# Patient Record
Sex: MALE | Race: WHITE | NOT HISPANIC OR LATINO | Employment: FULL TIME | URBAN - METROPOLITAN AREA
[De-identification: names, ages, dates, MRNs, and addresses within clinical notes are randomized per-mention and may not be internally consistent; named-entity substitution may affect disease eponyms.]

---

## 2023-11-14 ENCOUNTER — APPOINTMENT (OUTPATIENT)
Dept: PRIMARY CARE | Facility: CLINIC | Age: 58
End: 2023-11-14
Payer: COMMERCIAL

## 2023-11-15 ENCOUNTER — APPOINTMENT (OUTPATIENT)
Dept: NEUROLOGY | Facility: CLINIC | Age: 58
End: 2023-11-15
Payer: COMMERCIAL

## 2023-11-21 ENCOUNTER — OFFICE VISIT (OUTPATIENT)
Dept: NEUROLOGY | Facility: CLINIC | Age: 58
End: 2023-11-21
Payer: COMMERCIAL

## 2023-11-21 DIAGNOSIS — R56.9 SEIZURE (MULTI): Primary | ICD-10-CM

## 2023-11-21 PROCEDURE — 99213 OFFICE O/P EST LOW 20 MIN: CPT | Performed by: NURSE PRACTITIONER

## 2023-11-21 RX ORDER — LAMOTRIGINE 200 MG/1
200 TABLET ORAL 2 TIMES DAILY
COMMUNITY
Start: 2012-07-05 | End: 2023-11-21 | Stop reason: SDUPTHER

## 2023-11-21 RX ORDER — LAMOTRIGINE 200 MG/1
200 TABLET ORAL 2 TIMES DAILY
Qty: 180 TABLET | Refills: 3 | Status: SHIPPED | OUTPATIENT
Start: 2023-11-21 | End: 2024-11-20

## 2023-11-21 NOTE — PROGRESS NOTES
"    Patient ID: Sarthak Prado \"Pawan\" 58 y.o.male presenting in follow-up for epilepsy.     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 His seizure history dates back to 2012 when he first presented with a presumed generalized tonic-clonic seizure that was witnessed by his wife. He had an [facility] admission in 2012 which did not reveal any electrographic seizures. He has been on a stable dose of lamotrigine for many years. He has experienced increased irritability on keppra in the past. His prior EEGs were notable for L temporal lobe structural lesion. His had an MRI w/ and w/o contrast was completed in 2019, which showed a stable appearing L lateral temporal lobe cavernous malformation without associated hemorrhage. His last routine EEG was done on March 18, 2022, which was a normal awake EEG. Repeat MRI w/ and w/o from outside source on 4/27/2022 which showed stable size of cavernoma. No new structural lesions.       PRESENT CONCERNS:    He has ongoing speech arrest spells, described as the sudden onset of inability to speak. He says he has full awareness during the episodes. They last for \"maybe 5 minutes.\" He retains awareness during this time and believes he can understand others' speech. He feels back to normal within the 5 minutes. He has no premonitory symptoms, abnormal gustatory, auditory, olfactory, or visual hallucinations. These episodes occur every month to 1.5 months.  He cannot correlate the spells with any triggers or sleep deprivation. He has had no new medication changes. He denies episodes of LOC or abnormal movements.       Review of Systems   All " other systems reviewed and are negative.          RESULTS:  No EEG results found for the past 12 months    No results found for this or any previous visit (from the past 4464 hour(s)).        No MRI head results found for the past 12 months    No CT head results found for the past 12 months    No results found for this or any previous visit (from the past 4464 hour(s)).    Results for orders placed or performed in visit on 03/18/22   EEG    Narrative    Ordered by an unspecified provider.   Results for orders placed or performed in visit on 12/05/19   MR BRAIN W AND WO IV CONTRAST    Narrative    MRN: [id number]  Patient Name: ESPERANZA MAGALLANES     STUDY:  MRI BRAIN W/WO CONTRAST; 12/5/2019 9:35 am     INDICATION:  left temporal angioma complicated with epilpesy. PT .     COMPARISON:  10/30/2015 MR     ACCESSION NUMBER(S):  [id number]     ORDERING CLINICIAN:  ROSALIND VARGAS     TECHNIQUE:  Axial T2, FLAIR, DWI, gradient echo T2 and sagittal and coronal T1  weighted images of brain were acquired. Post contrast T1 weighted  images were acquired after administration of 19 mL gadobenate  (Multihance) gadolinium based intravenous contrast.     FINDINGS:  CSF Spaces: The ventricles, sulci and basal cisterns are within  normal limits.     Parenchyma: There is no diffusion restriction abnormality to suggest  acute infarct.     The posterior left temporal lobe has a 1.5 x 2.0 cm partially  enhancing cavernous angioma similar in size to the previous  examination. There is no surrounding parenchymal abnormality, also  similar to the prior study.     Otherwise, no mass, hemorrhage, infarct or abnormal enhancement is  noted.     Paranasal Sinuses and Mastoids: Visualized paranasal sinuses and  mastoid air cells are unremarkable.     IMPRESSION:  There is no significant change in the size of the cavernous angioma  in the posterior left temporal lobe; no signal abnormalities in the  adjacent parenchyma are noted.            There were no vitals filed for this visit.    Neurologic Exam       ASSESSMENT & PLAN:   58 y.o. male presenting in follow-up for peviously diagnosed epilepsy. Semiology as described above    Problem List Items Addressed This Visit       Seizure (CMS/HCC) - Primary    Relevant Medications    lamoTRIgine (LaMICtal) 200 mg tablet     Patient is stable on current regimen of -200  He has ~1 aphasic event every 4-6 weeks  This is stable frequency for him.   He does not have CHRISTI/LOC so he is not under restrictions at this time.   RTC 1 year

## 2024-01-17 ENCOUNTER — OFFICE VISIT (OUTPATIENT)
Dept: SLEEP MEDICINE | Facility: CLINIC | Age: 59
End: 2024-01-17
Payer: COMMERCIAL

## 2024-01-25 ENCOUNTER — OFFICE VISIT (OUTPATIENT)
Dept: SLEEP MEDICINE | Facility: CLINIC | Age: 59
End: 2024-01-25
Payer: COMMERCIAL

## 2024-01-25 VITALS
RESPIRATION RATE: 16 BRPM | OXYGEN SATURATION: 97 % | WEIGHT: 213 LBS | SYSTOLIC BLOOD PRESSURE: 134 MMHG | TEMPERATURE: 97.9 F | HEART RATE: 97 BPM | BODY MASS INDEX: 30.49 KG/M2 | DIASTOLIC BLOOD PRESSURE: 81 MMHG | HEIGHT: 70 IN

## 2024-01-25 DIAGNOSIS — R56.9 SEIZURE (MULTI): ICD-10-CM

## 2024-01-25 DIAGNOSIS — G47.33 OBSTRUCTIVE SLEEP APNEA: Primary | ICD-10-CM

## 2024-01-25 DIAGNOSIS — E66.9 OBESITY (BMI 30-39.9): ICD-10-CM

## 2024-01-25 PROCEDURE — 99204 OFFICE O/P NEW MOD 45 MIN: CPT | Performed by: INTERNAL MEDICINE

## 2024-01-25 PROCEDURE — 99214 OFFICE O/P EST MOD 30 MIN: CPT | Performed by: INTERNAL MEDICINE

## 2024-01-25 ASSESSMENT — SLEEP AND FATIGUE QUESTIONNAIRES
HOW LIKELY ARE YOU TO NOD OFF OR FALL ASLEEP WHILE SITTING QUIETLY AFTER LUNCH WITHOUT ALCOHOL: WOULD NEVER DOZE
WAKING_TOO_EARLY: MODERATE
HOW LIKELY ARE YOU TO NOD OFF OR FALL ASLEEP WHILE SITTING AND READING: WOULD NEVER DOZE
HOW LIKELY ARE YOU TO NOD OFF OR FALL ASLEEP WHEN YOU ARE A PASSENGER IN A CAR FOR AN HOUR WITHOUT A BREAK: WOULD NEVER DOZE
DIFFICULTY_FALLING_ASLEEP: MODERATE
WORRIED_DISTRESSED_DUE_TO_SLEEP: A LITTLE
SLEEP_PROBLEM_INTERFERES_DAILY_ACTIVITIES: NOT AT ALL NOTICEABLE
HOW LIKELY ARE YOU TO NOD OFF OR FALL ASLEEP WHILE SITTING AND TALKING TO SOMEONE: WOULD NEVER DOZE
SLEEP_PROBLEM_NOTICEABLE_TO_OTHERS: NOT AT ALL NOTICEABLE
HOW LIKELY ARE YOU TO NOD OFF OR FALL ASLEEP IN A CAR, WHILE STOPPED FOR A FEW MINUTES IN TRAFFIC: WOULD NEVER DOZE
ESS-CHAD TOTAL SCORE: 3
SITING INACTIVE IN A PUBLIC PLACE LIKE A CLASS ROOM OR A MOVIE THEATER: WOULD NEVER DOZE
SATISFACTION_WITH_CURRENT_SLEEP_PATTERN: SATISFIED
HOW LIKELY ARE YOU TO NOD OFF OR FALL ASLEEP WHILE LYING DOWN TO REST IN THE AFTERNOON WHEN CIRCUMSTANCES PERMIT: MODERATE CHANCE OF DOZING
HOW LIKELY ARE YOU TO NOD OFF OR FALL ASLEEP WHILE WATCHING TV: SLIGHT CHANCE OF DOZING
DIFFICULTY_STAYING_ASLEEP: MODERATE

## 2024-01-25 NOTE — PROGRESS NOTES
"[facility] SLEEP MEDICINE CLIN[facility]  NEW CONSULT VISIT NOTE    PCP: Sharan Alvarez MD  Referred by: None, self-referred    HISTORY OF PRESENT ILLNESS     Patient ID: Sarthak Prado \"Heidy" is a 58 y.o. male who presents to [facility] for a comprehensive sleep medicine evaluation with concerns of sleep apnea on CPAP .    Patient is here alone today.  To review, patient's medical history is notable for epilepsy and JS on CPAP      Patient was diagnosed with JS in  by PSG and was started CPAP since then. Currently on auto-CPAP fixed CPAP 8  cm H2O with EPR/Flex 1 and Respironics Jazzmine view full face mask thru [facility]. Patient has been using machine every night.   Patient denies aerophagia, dry mouth, skin irritation, nasal congestion, and snoring on PAP. Complains of issues with machine, air hunger , and mask leak. Machine is reporting that it had exceeded motor life.   The following are patient's perceived benefits of PAP: no snoring, rested in the morning, decreased daytime sleepiness and fatigue    SLEEP STUDY HISTORY (personally reviewed raw data such as interpretation report, data sheet, hypnogram, and titration table if available and applicable)  PSG 2009: AHI 4.4  PAP titration 2009: CPAP 8 cm H2O was recommended  HSAT 2016: AHI 8.7, SpO2 aida 86%, spent 11.7 mins with SpO2<89%    SLEEP-WAKE SCHEDULE  Bedtime:  10:30 PM daily  Subjective sleep latency: 15 minutes  Number of awakenings:  3 times per night due to stress  Nocturia: No  Falls back asleep in 5 minutes to 60-90 minutes  Final wake time:  7 AM daily  Out of bed time:  7 AM daily  Shift work: No  Naps: no  Average sleep duration (excluding naps): 7-8 hours    SLEEP ENVIRONMENT  Sleep location: bed  Sleep status: sleeps with wife  Preferred sleep position: side  TV in bedroom: Yes  Room is dark: Yes  Room is quiet: Yes  Room is cool: Yes    SLEEP HABITS  Smokin.5 pack per week  ETOH: 1-2 " drinks 3x per month  Marijuana: no  Caffeine: 3 cups coffee daily  Sleep aids: no    SLEEP ROS  Night symptoms: POSITIVE for snoring before PAP but not now and heartburn or sour taste in mouth at night and NEGATIVE for witnessed apnea, wake up gasping and/or choking for air, nasal congestion and/or post nasal drip, mouth breathing, night sweats during sleep, waking up with racing heart, nocturnal cough, and nocturia  Morning symptoms: POSITIVE for unrefreshing sleep before PAP but not now and NEGATIVE for morning headache, morning dry mouth, and morning sore throat  Daytime symptoms POSITIVE for excessive daytime sleepiness before PAP but not now and fatigue before PAP but not now and NEGATIVE for trouble remembering things in daytime, trouble staying focused in daytime, irritability in daytime, and drowsy driving  Hypersomnia / narcolepsy symptoms: Patient denies symptoms of a hypersomnolence disorder such as sleep paralysis, sleep-related hallucinations, recurrent sleep attacks, automatic behaviors, and cataplexy.   Parasomnia symptoms: Patient denies symptoms of parasomnia.  Movements in sleep: Patient denies problematic movements in sleep,     RLS screen: Patient denies RLS symptoms.    WEIGHT: stable    Claustrophobia: No    REVIEW OF SYSTEMS     All other systems have been reviewed and are negative.    ALLERGIES     Allergies   Allergen Reactions    Penicillins Unknown    Tetanus Vaccines And Toxoid Unknown       MEDICATIONS     Current Outpatient Medications   Medication Sig Dispense Refill    lamoTRIgine (LaMICtal) 200 mg tablet Take 1 tablet (200 mg) by mouth 2 times a day. 180 tablet 3     No current facility-administered medications for this visit.       PAST HISTORIES     PERTINENT PAST MEDICAL HISTORY: See HPI    PERTINENT PAST SURGICAL HISTORY for Sleep Medicine:  non-contributory    PERTINENT FAMILY HISTORY for Sleep Medicine:  sleep apnea on PAP- brother    PERTINENT SOCIAL HISTORY:  He  reports that  "he has been smoking cigarettes. He has never used smokeless tobacco. No history on file for alcohol use and drug use. He currently lives with spouse and self-employed    Active Problems, Allergy List, Medication List, and PMH/PSH/FH/Social Hx have been reviewed and reconciled in chart. No significant changes unless documented in the pertinent chart section. Updates made when necessary.     PHYSICAL EXAM     VITAL SIGNS: /81   Pulse 97   Temp 36.6 °C (97.9 °F)   Resp 16 Comment: RA  Ht 1.778 m (5' 10\")   Wt 96.6 kg (213 lb)   SpO2 97%   BMI 30.56 kg/m²     NECK CIRCUMFERENCE: 17.75  inches    ESS: 3  LALA: 8    Physical Exam  Constitutional: Awake, not in distress  Head: normocephalic, atraumatic  Craniofacial: no retrognathia  Sinus: no tenderness to palpation  Nose: bilateral inferior turbinate hypertrophy, Hard to breathe on R nostril alone, deviated nasal septum  Dental: Class 3 bite, no overjet, + crossbite  Palate: Narrow and High-arched   Oropharynx: Carson tongue position 3, Mallampati 3, lateral wall narrowing grade 4   Tonsils: not seen  Uvula: midline on phonation  Tongue: Midline on protrusion, no Tongue scalloping, + macroglossia  Lungs: Clear to auscultation bilateral, no rales  Heart: Regular rate and rhythm, no murmurs  Skin: Warm, no rash  Neuro: No tremors, moves all extremities  Psych: alert and oriented to time, place, and person    RESULTS/DATA     No results found for: \"IRON\", \"TRANSFERRIN\", \"IRONSAT\", \"TIBC\", \"FERRITIN\"    Bicarbonate   Date Value Ref Range Status   12/11/2020 30 24 - 31 MMOL/L Final     PAP Adherence  A PAP adherence data was obtained and reviewed personally today in clinic. (see scanned document in EPIC)  Source of data: SD card  Machine: [employer] AirsenEvodental 10 CPAP  Setup date:   Settings:  fixed CPAP 8  cm H2O and EPR 1  Date Range: 12/27/2023 to 1/25/2024  Days used: 29/30  >4 hrs usage: 97%  Average usage hours (days used): 8 hours 23 minutes  Leak: 95th " "percentile 36.5  AHI: 0.6     ASSESSMENT/PLAN     Sarthak Prado \"Heidy" is a 58 y.o. male who is seen today in [facility] for the following problems:.     OBSTRUCTIVE SLEEP APNEA of unknown severity  - Due to history of epilepsy and machine had exceeded motor life, recommend split night PSG (split if AHI>10) with full EEG montage to evaluate for JS.  - However, patient checked with insurance and was told that he does not need a new sleep study. Recommend getting a new auto-CPAP machine with following settings of auto-CPAP 5-15 cm H2O and EPR 3. Order sent to [facility]  - Refer to after-visit-summary (AVS) for more details on how to prepare for the sleep study.   - Discussed sleep apnea in detail to include pathophysiology, risk factors, diagnostic options, treatment options, and cardiovascular / neurologic consequences of untreated JS   - Supportive management: Lose weight. Stay off your back when sleeping. Avoid smoking, alcohol, and sedating medications if applicable. Don't drive when sleepy.    EUSTACHIAN TUBE DYSFUNCTION  - Consider trying fluticasone nasal spray 2 sprays per nostril once daily 1 hour before bedtime.  - Educated patient on proper use of intranasal sprays.     OBESITY   - BMI 30 today  - Recommend weight loss with diet and exercise.  - Weight loss can help in long term management of JS.  - Defer management to PCP    EPILEPSY  - Seizures occur 2x a month but the grand mal seizures was 12 years ago    All of patient's questions were answered. He verbalizes understanding and agreement with my assessment and plan. Refer to after-visit-summary (AVS) for patient education and more details on troubleshooting issues with PAP usage, proper cleaning instructions of PAP supplies, and usual recommended replacement schedule for each of the PAP supplies.     Follow-up 2-3 weeks after sleep study.  "

## 2024-01-30 PROBLEM — E66.9 OBESITY (BMI 30-39.9): Status: ACTIVE | Noted: 2024-01-30

## 2024-01-31 NOTE — PATIENT INSTRUCTIONS
"Thank you for coming to the Sleep Medicine Clinic today! Your sleep medicine provider today was: Annabelle Cerda MD Below is a summary of your treatment plan, patient education, other important information, and our contact numbers.      TREATMENT PLAN     - Get the following sleep study scheduled: Split night sleep study  - Please read the \"Patient Education\" section below for more detailed information. Try implementing tips, reminders, strategies, and supportive management.   - If not yet done, please sign up for [employer] to make a future schedule, send prescription requests, or send messages.    Follow-up Appointment:   Follow-up 2-3 weeks after sleep study.    PATIENT EDUCATION     Obstructive Sleep Apnea (JS) is a sleep disorder where your upper airway muscles relax during sleep and the airway intermittently and repetitively narrows and collapses leading to partially blocked airway (hypopnea) or completely blocked airway (apnea) which, in turn, can disrupt breathing in sleep, lower oxygen levels while you sleep and cause night time wakings. Because both apnea and hypopnea may cause higher carbon dioxide or low oxygen levels, untreated JS can lead to heart arrhythmia, elevation of blood pressure, and make it harder for the body to consolidate memory and facilitate metabolism (leading to higher blood sugars at night). Frequent partial arousals occur during sleep resulting in sleep deprivation and daytime sleepiness. JS is associated with an increased risk of cardiovascular disease, stroke, hypertension, and insulin resistance. Moreover, untreated JS with excessive daytime sleepiness can increase the risk of motor vehicular accidents.    Some conservative strategies for JS regardless of JS severity are:   Positional therapy - Avoid sleeping on your back.   Healthy diet and regular exercise to optimize weight is highly encouraged.   Avoid alcohol late in the evening and sedative-hypnotics as these substances can " make sleep apnea worse.   Improve breathing through the nose with intranasal steroid spray, saline rinse, or antihistamines    Safety: Avoid driving vehicle and operating heavy equipment while sleepy. Drowsy driving may lead to life-threatening motor vehicle accidents. A person driving while sleepy is 5 times more likely to have an accident. If you feel sleepy, pull over and take a short power nap (sleep for less than 30 minutes). Otherwise, ask somebody to drive you.    Treatment options for sleep apnea include weight management, positional therapy, Positive Airway Therapy (PAP) therapy, oral appliance therapy, hypoglossal nerve stimulator (Inspire) and select airway surgeries.    Sleep Testing for sleep apnea: The best and ideal way to check out if you have sleep apnea is to do an overnight sleep study in the sleep laboratory. Alternatively, a home sleep apnea test can also be done depending on your insurance and risk factors.     If you are having an overnight in-lab sleep study, please make sure to bring toiletries, a comfy pillow, additional warm blankets, and any nighttime medications (include as-needed inhaler, pain pill, etc) that you may regularly take. Also, be sure to eat dinner before you arrive as we generally do not provide meals inside the sleep testing center. Lastly, in order to fall asleep faster in the sleep testing center, we advise patients to wake up 2 hours earlier on the morning of scheduled testing and avoid napping 2 days prior testing. Sometimes, your sleep provider may prescribe a sleep aid to be taken at lights out in the sleep testing center. If you are taking a sleep aid, consider having somebody pick you up after the sleep testing.    Overnight sleep studies may be scheduled on a weekday or weekend. We also perform daytime testing for shift workers on a case-by-case basis.    Once you have booked an appointment to do the sleep study, please contact my office for follow-up visit to  discuss results.    On the other hand, if you have any of the following, please consider calling the sleep testing center to RESCHEDULE your sleep study appointment:  If you tested positive for COVID within 10 days of your sleep study appointment.  If you were exposed to somebody who was confirmed for COVID within 10 days of your sleep study appointment and now you are having symptoms of possible COVID  If you have fever>100F or any acute symptoms that you think will lead to poor sleep during testing (e.g. new or worsening stuffy nose not relieved by steroid nasal spray)  If you have traveled domestically or internationally in the last month and now you are having symptoms of possible COVID    You can also go to the following EDUCATION WEBSITES for further information:   American Academy of Sleep Medicine http://sleepeducation.org  National Sleep Foundation: https://sleepfoundation.org  American Sleep Apnea Association: https://www.sleepapnea.org (for patients with sleep apnea)  Narcolepsy Network: https://www.narcolepsynetwork.org (for patients with narcolepsy)  [employer]: https://www.wakeupnarcolepsy.org (for patients with narcolepsy)  Hypersomnia Foundation: https://www.hypersomniafoundation.org (for patients with idiopathic hypersomnia)  RLS foundation: https://www.rls.org (for patients with restless leg syndrome)    IMPORTANT INFORMATION     Call 911 for medical emergencies.  Our offices are generally open from Monday-Friday, 8 am - 5 pm.   There are no supporting services by either the sleep doctors or their staff on weekends and Holidays, or after 5 PM on weekdays.   If you need to get in touch with me, you may either call my office number or you can use [employer].  If a referral for a test, for CPAP, or for another specialist was made, and you have not heard about scheduling this within a week, please call scheduling at [phone]).  If you are unable to make your appointment for clinic or  an overnight study, kindly call the office or sleep testing center at least 48 hours in advance to cancel and reschedule.  If you are on CPAP, please bring your device's card and/or the device to each clinic appointment.   In case of problems with PAP machine or mask interface, please contact your DME (Durable Medical Equipment) company first. DME is the company who provides you the machine and/or PAP supplies.       PRESCRIPTIONS     We require 7 days advanced notice for prescription refills. If we do not receive the request in this time, we cannot guarantee that your medication will be refilled in time.    IMPORTANT PHONE NUMBERS     Sleep Medicine Clinic Fax: [phone]  Appointments (for Pediatric Sleep Clinic): [phone] - option 1  Appointments (for Adult Sleep Clinic): [phone] - option 2  Appointments (For Sleep Studies): [phone] - option 3  Behavioral Sleep Medicine: [phone]  Sleep Surgery: [phone]  Nutrition Service: [phone]  Weight management clinics with endocrinology: [phone]  Bariatric Services: [phone] (includes weight loss medications and weight loss surgery)  [facility]: [phone] (offers holistic approaches to weight management)  ENT (Otolaryngology): [phone]  Headache Clinic (Neurology): [phone]  Neurology: [phone]  Psychiatry: [phone]  Pulmonary Function Testing (PFT) Center: [phone]  Pulmonary Medicine: [phone]  Medical Service Company (DME): [phone]      OUR SLEEP TESTING LOCATIONS     Our team will contact you to schedule your sleep study, however, you can contact us as follow:  Main Phone Line (scheduling only): [phone]), option 3  Adult and Pediatric Locations  [facility] (6 years and older): [facility] - 4th floor ([address] [place] [address]) After hours line: [phone]  [facility] (Main campus:  "All ages): [facility], 6th floor. After hours line: [phone]   [place] (5 years and older; younger considered on case-by-case basis): [address]; [address], [address]. Scheduling  After hours line: [phone]   [place] (6 years and older): [address]; [address]; [address]   [place] (6 years and older): [address] [facility], [address]  After hours line: [phone]   [place] (13 years and older) in [place]: [address], 2nd floor  After hours line: [phone]   [place] (13 year and older): [address]  After hours line: [phone]     Adult Only Locations:   [place] (18 years and older): [address] [facility], 2nd floor   [place] (18 years and older): [address] [facility]; 4th floor  After hours line: [phone]   [place] (18 years and older) at [place]: [address] [place] [address]  After hours line: [phone]     CONTACTING YOUR SLEEP MEDICINE PROVIDER AND SLEEP TEAM      For issues with your machine or mask interface, please call your DME provider first. DME stands for durable medical company. DME is the company who provides you the machine and/or PAP supplies / accessories.   To schedule, cancel, or reschedule SLEEP STUDY APPOINTMENTS, please call the Main Phone Line at [phone] - option 3.   To schedule, cancel, or reschedule CLINIC APPOINTMENTS, you can do it in \"[employer]\", call [phone] to speak with my  (Gabi Woods), or call the Main Phone Line at [phone] - option 2  For CLINICAL QUESTIONS or MEDICATION REFILLS, please call direct line for Adult Sleep Nurses at [phone].   Lastly, you can also send a message directly to your provider through \"My Chart\", which is the email service through your  Records Account: [email or web address]       Here at [facility], we wish you a restful sleep!    "

## 2024-02-07 ENCOUNTER — TELEPHONE (OUTPATIENT)
Dept: SLEEP MEDICINE | Facility: CLINIC | Age: 59
End: 2024-02-07
Payer: COMMERCIAL

## 2024-02-07 DIAGNOSIS — G47.33 OBSTRUCTIVE SLEEP APNEA: Primary | ICD-10-CM

## 2024-02-07 NOTE — TELEPHONE ENCOUNTER
Ps spoke to his insurance MMO and he does no need a sleep study for new sleep equipment, requesting new equipment order.  Pls advise.

## 2024-03-05 ENCOUNTER — APPOINTMENT (OUTPATIENT)
Dept: SLEEP MEDICINE | Facility: CLINIC | Age: 59
End: 2024-03-05
Payer: COMMERCIAL

## 2024-03-05 ENCOUNTER — APPOINTMENT (OUTPATIENT)
Dept: DERMATOLOGY | Facility: CLINIC | Age: 59
End: 2024-03-05
Payer: COMMERCIAL

## 2024-06-15 ENCOUNTER — APPOINTMENT (OUTPATIENT)
Dept: RADIOLOGY | Facility: HOSPITAL | Age: 59
End: 2024-06-15
Payer: COMMERCIAL

## 2024-06-15 ENCOUNTER — HOSPITAL ENCOUNTER (EMERGENCY)
Facility: HOSPITAL | Age: 59
Discharge: HOME | End: 2024-06-15
Attending: GENERAL PRACTICE
Payer: COMMERCIAL

## 2024-06-15 ENCOUNTER — APPOINTMENT (OUTPATIENT)
Dept: CARDIOLOGY | Facility: HOSPITAL | Age: 59
End: 2024-06-15
Payer: COMMERCIAL

## 2024-06-15 VITALS
DIASTOLIC BLOOD PRESSURE: 90 MMHG | TEMPERATURE: 98.1 F | RESPIRATION RATE: 20 BRPM | WEIGHT: 212 LBS | OXYGEN SATURATION: 95 % | HEART RATE: 62 BPM | SYSTOLIC BLOOD PRESSURE: 131 MMHG | BODY MASS INDEX: 30.35 KG/M2 | HEIGHT: 70 IN

## 2024-06-15 DIAGNOSIS — R07.89 ATYPICAL CHEST PAIN: Primary | ICD-10-CM

## 2024-06-15 DIAGNOSIS — J18.1 LUNG CONSOLIDATION (CMS-HCC): ICD-10-CM

## 2024-06-15 LAB
ALBUMIN SERPL BCP-MCNC: 4.3 G/DL (ref 3.4–5)
ALP SERPL-CCNC: 71 U/L (ref 33–120)
ALT SERPL W P-5'-P-CCNC: 26 U/L (ref 10–52)
ANION GAP SERPL CALC-SCNC: 11 MMOL/L (ref 10–20)
AST SERPL W P-5'-P-CCNC: 19 U/L (ref 9–39)
BASOPHILS # BLD AUTO: 0.03 X10*3/UL (ref 0–0.1)
BASOPHILS NFR BLD AUTO: 0.5 %
BILIRUB SERPL-MCNC: 0.6 MG/DL (ref 0–1.2)
BUN SERPL-MCNC: 14 MG/DL (ref 6–23)
CALCIUM SERPL-MCNC: 9.7 MG/DL (ref 8.6–10.3)
CARDIAC TROPONIN I PNL SERPL HS: 3 NG/L (ref 0–20)
CHLORIDE SERPL-SCNC: 107 MMOL/L (ref 98–107)
CO2 SERPL-SCNC: 27 MMOL/L (ref 21–32)
CREAT SERPL-MCNC: 0.88 MG/DL (ref 0.5–1.3)
EGFRCR SERPLBLD CKD-EPI 2021: >90 ML/MIN/1.73M*2
EOSINOPHIL # BLD AUTO: 0.17 X10*3/UL (ref 0–0.7)
EOSINOPHIL NFR BLD AUTO: 3 %
ERYTHROCYTE [DISTWIDTH] IN BLOOD BY AUTOMATED COUNT: 13.7 % (ref 11.5–14.5)
GLUCOSE SERPL-MCNC: 98 MG/DL (ref 74–99)
HCT VFR BLD AUTO: 44.4 % (ref 41–52)
HGB BLD-MCNC: 14.6 G/DL (ref 13.5–17.5)
IMM GRANULOCYTES # BLD AUTO: 0.01 X10*3/UL (ref 0–0.7)
IMM GRANULOCYTES NFR BLD AUTO: 0.2 % (ref 0–0.9)
LYMPHOCYTES # BLD AUTO: 1.1 X10*3/UL (ref 1.2–4.8)
LYMPHOCYTES NFR BLD AUTO: 19.6 %
MCH RBC QN AUTO: 30.2 PG (ref 26–34)
MCHC RBC AUTO-ENTMCNC: 32.9 G/DL (ref 32–36)
MCV RBC AUTO: 92 FL (ref 80–100)
MONOCYTES # BLD AUTO: 0.53 X10*3/UL (ref 0.1–1)
MONOCYTES NFR BLD AUTO: 9.4 %
NEUTROPHILS # BLD AUTO: 3.78 X10*3/UL (ref 1.2–7.7)
NEUTROPHILS NFR BLD AUTO: 67.3 %
NRBC BLD-RTO: 0 /100 WBCS (ref 0–0)
PLATELET # BLD AUTO: 285 X10*3/UL (ref 150–450)
POTASSIUM SERPL-SCNC: 4 MMOL/L (ref 3.5–5.3)
PROT SERPL-MCNC: 7.1 G/DL (ref 6.4–8.2)
RBC # BLD AUTO: 4.84 X10*6/UL (ref 4.5–5.9)
SODIUM SERPL-SCNC: 141 MMOL/L (ref 136–145)
WBC # BLD AUTO: 5.6 X10*3/UL (ref 4.4–11.3)

## 2024-06-15 PROCEDURE — 99285 EMERGENCY DEPT VISIT HI MDM: CPT | Mod: 25

## 2024-06-15 PROCEDURE — 96374 THER/PROPH/DIAG INJ IV PUSH: CPT | Mod: 59

## 2024-06-15 PROCEDURE — 36415 COLL VENOUS BLD VENIPUNCTURE: CPT

## 2024-06-15 PROCEDURE — 71046 X-RAY EXAM CHEST 2 VIEWS: CPT

## 2024-06-15 PROCEDURE — 2550000001 HC RX 255 CONTRASTS: Performed by: GENERAL PRACTICE

## 2024-06-15 PROCEDURE — 71046 X-RAY EXAM CHEST 2 VIEWS: CPT | Performed by: RADIOLOGY

## 2024-06-15 PROCEDURE — 73030 X-RAY EXAM OF SHOULDER: CPT | Mod: RIGHT SIDE | Performed by: RADIOLOGY

## 2024-06-15 PROCEDURE — 71275 CT ANGIOGRAPHY CHEST: CPT

## 2024-06-15 PROCEDURE — 2500000005 HC RX 250 GENERAL PHARMACY W/O HCPCS

## 2024-06-15 PROCEDURE — 80053 COMPREHEN METABOLIC PANEL: CPT

## 2024-06-15 PROCEDURE — 2500000004 HC RX 250 GENERAL PHARMACY W/ HCPCS (ALT 636 FOR OP/ED)

## 2024-06-15 PROCEDURE — 73030 X-RAY EXAM OF SHOULDER: CPT | Mod: RT

## 2024-06-15 PROCEDURE — 2500000001 HC RX 250 WO HCPCS SELF ADMINISTERED DRUGS (ALT 637 FOR MEDICARE OP)

## 2024-06-15 PROCEDURE — 93005 ELECTROCARDIOGRAM TRACING: CPT

## 2024-06-15 PROCEDURE — 85025 COMPLETE CBC W/AUTO DIFF WBC: CPT

## 2024-06-15 PROCEDURE — 71275 CT ANGIOGRAPHY CHEST: CPT | Performed by: RADIOLOGY

## 2024-06-15 PROCEDURE — 84484 ASSAY OF TROPONIN QUANT: CPT

## 2024-06-15 RX ORDER — ACETAMINOPHEN 325 MG/1
975 TABLET ORAL ONCE
Status: COMPLETED | OUTPATIENT
Start: 2024-06-15 | End: 2024-06-15

## 2024-06-15 RX ORDER — LIDOCAINE 50 MG/G
1 PATCH TOPICAL DAILY
Qty: 5 PATCH | Refills: 0 | Status: SHIPPED | OUTPATIENT
Start: 2024-06-15 | End: 2024-06-20

## 2024-06-15 RX ORDER — KETOROLAC TROMETHAMINE 30 MG/ML
15 INJECTION, SOLUTION INTRAMUSCULAR; INTRAVENOUS ONCE
Status: COMPLETED | OUTPATIENT
Start: 2024-06-15 | End: 2024-06-15

## 2024-06-15 RX ORDER — AZITHROMYCIN 500 MG/1
500 TABLET, FILM COATED ORAL ONCE
Status: COMPLETED | OUTPATIENT
Start: 2024-06-15 | End: 2024-06-15

## 2024-06-15 RX ORDER — LIDOCAINE 560 MG/1
1 PATCH PERCUTANEOUS; TOPICAL; TRANSDERMAL DAILY
Status: DISCONTINUED | OUTPATIENT
Start: 2024-06-15 | End: 2024-06-15 | Stop reason: HOSPADM

## 2024-06-15 RX ORDER — AZITHROMYCIN 250 MG/1
250 TABLET, FILM COATED ORAL DAILY
Qty: 4 TABLET | Refills: 0 | Status: SHIPPED | OUTPATIENT
Start: 2024-06-16 | End: 2024-06-20

## 2024-06-15 RX ORDER — NAPROXEN 500 MG/1
500 TABLET ORAL EVERY 12 HOURS
Qty: 6 TABLET | Refills: 0 | Status: SHIPPED | OUTPATIENT
Start: 2024-06-15 | End: 2024-06-18

## 2024-06-15 RX ORDER — ACETAMINOPHEN 500 MG
1000 TABLET ORAL EVERY 8 HOURS PRN
Qty: 18 TABLET | Refills: 0 | Status: SHIPPED | OUTPATIENT
Start: 2024-06-15 | End: 2024-06-18

## 2024-06-15 RX ADMIN — ACETAMINOPHEN 975 MG: 325 TABLET ORAL at 10:35

## 2024-06-15 RX ADMIN — KETOROLAC TROMETHAMINE 15 MG: 30 INJECTION, SOLUTION INTRAMUSCULAR at 13:42

## 2024-06-15 RX ADMIN — AZITHROMYCIN DIHYDRATE 500 MG: 500 TABLET ORAL at 14:26

## 2024-06-15 RX ADMIN — IOHEXOL 75 ML: 350 INJECTION, SOLUTION INTRAVENOUS at 12:51

## 2024-06-15 RX ADMIN — LIDOCAINE 4% 1 PATCH: 40 PATCH TOPICAL at 10:35

## 2024-06-15 ASSESSMENT — HEART SCORE
AGE: 45-64
ECG: NORMAL
TROPONIN: LESS THAN OR EQUAL TO NORMAL LIMIT
HISTORY: SLIGHTLY SUSPICIOUS
HEART SCORE: 1
RISK FACTORS: NO KNOWN RISK FACTORS

## 2024-06-15 ASSESSMENT — COLUMBIA-SUICIDE SEVERITY RATING SCALE - C-SSRS
1. IN THE PAST MONTH, HAVE YOU WISHED YOU WERE DEAD OR WISHED YOU COULD GO TO SLEEP AND NOT WAKE UP?: NO
6. HAVE YOU EVER DONE ANYTHING, STARTED TO DO ANYTHING, OR PREPARED TO DO ANYTHING TO END YOUR LIFE?: NO
2. HAVE YOU ACTUALLY HAD ANY THOUGHTS OF KILLING YOURSELF?: NO

## 2024-06-15 NOTE — ED TRIAGE NOTES
Pt presents to ED for right shoulder pain that started yesterday after golfing. Pt denies any known injuries.

## 2024-06-15 NOTE — ED PROVIDER NOTES
HPI   Chief Complaint   Patient presents with    Shoulder Pain       59-year-old male presents the ED today with a chief concern of right-sided chest pain.  Patient states symptoms started yesterday.  States the pain is gradual onset.  He states that the pain is located over the right side of the chest and wraps around into the right side of the back.  He describes the pain as ripping.  He used ice and heat on the area yesterday with little relief.  He denies any neck or jaw pain.  States that when he removes his right shoulder he feels that the pain is worse.  He states the pain is located over his right pec.  He denies any known trauma or injury to the area.  History of epilepsy.  Did not have a seizure.  Denies any numbness or tingling or weakness.  He denies any syncope.  Denies recent travel or surgeries.  Denies cough, sputum, or hemoptysis.  Has no other symptoms or concerns this time.      History provided by:  Patient   used: No                        [place] Coma Scale Score: 15                     Patient History   No past medical history on file.  Past Surgical History:   Procedure Laterality Date    MOUTH SURGERY  09/02/2015    Oral Surgery Tooth Extraction     No family history on file.  Social History     Tobacco Use    Smoking status: Some Days     Types: Cigarettes    Smokeless tobacco: Never   Substance Use Topics    Alcohol use: Not on file    Drug use: Not on file       Physical Exam   ED Triage Vitals [06/15/24 0942]   Temperature Heart Rate Respirations BP   36.7 °C (98.1 °F) 76 13 (!) 140/94      Pulse Ox Temp Source Heart Rate Source Patient Position   98 % Oral -- Sitting      BP Location FiO2 (%)     Left arm --       Physical Exam  Constitutional: Vital signs per nursing notes.  Well developed, well nourished.  No acute distress.    Psychiatric: alert and oriented to person, place, and time; no abnormalities of mood or affect; memory intact  Eyes: PERRL; conjunctivae  and lids normal  ENT: Ears normal externally; face symmetric. voice normal  Neck: neck supple, no meningismus; trachea midline without deviation  Respiratory: normal respiratory effort and excursion; no rales, rhonchi, or wheezes; equal air entry  Cardiovascular: RRR, 2+ pulses extremities.  Positive reproducible chest wall tenderness on right side.  No zoster rash.  Neurological: normal speech; CN II-XII grossly intact, normal motor and sensory function  GI: no distention, soft, nontender.  No rebound tenderness or guarding.  No palpable masses.  No pulsatile masses.  : Deferred  Musculoskeletal: normal gait and station; normal digits and nails; normal to palpation; normal strength/tone; neurovascular status intact.  Skin: normal to inspection; normal to palpation; no rash    ED Course & MDM   ED Course as of 06/15/24 1424   Sat David 15, 2024   1051 FINDINGS:  RESULT: There is no evidence for fracture or dislocation.  No bone  lesion is identified. The glenohumeral joint appears unremarkable  although limited given the two views. The acromioclavicular joint  appears unremarkable. No soft tissue abnormality is identified.      IMPRESSION:  No evidence for acute osseous abnormality of the right shoulder.      Signed by: Christopher Chang 6/15/2024 10:34 AM  Dictation workstation:   [id number]   []   1051 IMPRESSION:  No acute cardiopulmonary process.          Signed by: Christopher Chang 6/15/2024 10:31 AM  Dictation workstation:   [id number]   []   1052 Ventricular rate 68 bpm.  NC interval 178 ms.  QRS duration 84 ms.  QT/QTc 412/438 ms.  Patient is in normal sinus rhythm at a ventricular rate of 68 bpm.  Normal axis.  There is good R wave progression.  No right or left bundle-branch block.  No ST elevations or T wave inversions.  Left ventricular hypertrophy.  No previous EKG to compare this to. [MC]   1301 CBC shows no evidence of leukocytosis or anemia.  CMP shows no FRANCISCO or acute liver injury.  Troponin  normal. []   1343 Patient was given a printed out copy of CT findings []   1343 Monocytes %: 9.4 []      ED Course User Index  [] Valeriano Giraldo PA-C         Diagnoses as of 06/15/24 1424   Atypical chest pain   Lung consolidation (CMS-HCC)       Medical Decision Making  59-year-old male presents the ED today with a chief concern of right-sided chest pain.  Vital signs reassuring.  Patient overall appears well and is nontoxic-appearing. Patient has full range of motion of the neck without any meningismus.  Satting well on room air.  Not hypoxic.  Not tachycardic.  Afebrile.  He was given azithromycin, topical lidocaine patch, Toradol in the ED.  [place] better after administration of these medications.  Labs overall reassuring with no leukocytosis or anemia.  No FRANCISCO or acute liver injury.   troponin is 3.  EKG shows no ischemic changes.  Chest x-ray shows no acute cardiopulmonary process.  X-ray of right shoulder is unremarkable.  CT shows no evidence of PE.  He does have partial collapse/consolidation within the lower lobes as well as in the lingula.  Mild patchy nonspecific groundglass opacity seen on CT.  His heart score is 1.  He has 2+ symmetric pulses throughout.  No abdominal pulsatile mass.  Low suspicion for AAA at this time.  In addition, CT PE does not note any AAA.  There is no zoster rash.  Not concern for any pancreatitis at this time.  He has clear reproducible musculoskeletal tenderness on exam.  His CT does show a consolidation.  He has no peripheral edema.  Not concern for CHF exacerbation or new onset CHF.  Will treat outpatient with azithromycin as well.  He was given a dose of this in the ED.  No systemic signs or symptoms.  Will treat outpatient with analgesics and muscle relaxants.  Discussed my impression and findings with patient he feels comfortable returning home.  We discussed very strict return precautions including returning for any new or worsening signs or home.  Patient is in  agreement this plan.  He will follow-up with the PCP within 3 days.  Again discussed strict return precautions.  Patient was given a printed out copy of his CT findings.    Differential diagnosis: ACS, aortic dissection, Boerhaave syndrome, PE, pneumothorax, pericardial tamponade, cocaine induced chest pain, endocarditis, myocarditis, cardiomyopathy, COPD, asthma, GERD, costochondritis/musculoskeletal, pericarditis, pneumonia, zoster    Disposition/treatment  1.  Atypical chest pain  2.  Lung consolidation    Shared decision-making was used patient feels comfortable returning home     Patient was advised to follow up with recommended provider in 1 day1 for another evaluation and exam. I advised patient/guardian to return or go to closest emergency room immediately if symptoms change, get worse, new symptoms develop prior to follow up. If there is no improvement in symptoms in the next 24 hours they are advised to return for further evaluation and exam. I also explained the plan and treatment course. Patient/guardian is in agreement with plan, treatment course, and follow up and states verbally that they will comply.    Homegoing. I discussed the differential; results and discharge plan with the patient and/or family/friend/caregiver if present.  I emphasized the importance of follow-up with the physician I referred them to in the timeframe recommended.  I explained reasons for the patient to return to the Emergency Department. They agreed that if they feel their condition is worsening or if they have any other concern they should call 911 immediately for further assistance. I gave the patient an opportunity to ask all questions they had and answered all of them accordingly. They understand return precautions and discharge instructions. The patient and/or family/friend/caregiver expressed understanding verbally and that they would comply.        This note has been transcribed using voice recognition and may contain  grammatical errors, misplaced words, incorrect words, incorrect phrases or other errors.        Procedure  Procedures     Valeriano Giraldo PA-C  06/15/24 8463

## 2024-06-15 NOTE — PROGRESS NOTES
"Pharmacy Medication History Review    Sarthak Prado \"Heidy" is a 59 y.o. male admitted for No Principal Problem: There is no principal problem currently on the Problem List. Please update the Problem List and refresh.. Pharmacy reviewed the patient's [id number] medications and allergies for accuracy.    The list below reflectives the updated PTA list. Please review each medication in order reconciliation for additional clarification and justification.  Prior to Admission Medications   Prescriptions Last Dose Informant     lamoTRIgine (LaMICtal) 200 mg tablet 6/15/2024 at 8:00 am      Sig: Take 1 tablet (200 mg) by mouth 2 times a day.      Facility-Administered Medications: None      Allergies  Reviewed by HERMELINDA Portillo on 6/15/2024        Severity Reactions Comments    Penicillins Not Specified Unknown     Tetanus Vaccines And Toxoid Not Specified Unknown             Below are additional concerns with the patient's PTA list.  Patient verified medication and dose.    Chen Bustos    "

## 2024-06-19 LAB
ATRIAL RATE: 68 BPM
P AXIS: 16 DEGREES
P OFFSET: 179 MS
P ONSET: 124 MS
PR INTERVAL: 178 MS
Q ONSET: 213 MS
QRS COUNT: 11 BEATS
QRS DURATION: 84 MS
QT INTERVAL: 412 MS
QTC CALCULATION(BAZETT): 438 MS
QTC FREDERICIA: 429 MS
R AXIS: 1 DEGREES
T AXIS: 34 DEGREES
T OFFSET: 419 MS
VENTRICULAR RATE: 68 BPM

## 2024-06-27 ENCOUNTER — OFFICE VISIT (OUTPATIENT)
Dept: SLEEP MEDICINE | Facility: CLINIC | Age: 59
End: 2024-06-27
Payer: COMMERCIAL

## 2024-06-27 VITALS
BODY MASS INDEX: 30.71 KG/M2 | DIASTOLIC BLOOD PRESSURE: 87 MMHG | TEMPERATURE: 97.3 F | HEART RATE: 98 BPM | WEIGHT: 214.5 LBS | RESPIRATION RATE: 16 BRPM | OXYGEN SATURATION: 96 % | SYSTOLIC BLOOD PRESSURE: 143 MMHG | HEIGHT: 70 IN

## 2024-06-27 DIAGNOSIS — E66.9 OBESITY (BMI 30-39.9): Primary | ICD-10-CM

## 2024-06-27 DIAGNOSIS — G47.33 OSA ON CPAP: ICD-10-CM

## 2024-06-27 PROCEDURE — 99214 OFFICE O/P EST MOD 30 MIN: CPT | Performed by: INTERNAL MEDICINE

## 2024-06-27 PROCEDURE — G2211 COMPLEX E/M VISIT ADD ON: HCPCS | Performed by: INTERNAL MEDICINE

## 2024-06-27 ASSESSMENT — SLEEP AND FATIGUE QUESTIONNAIRES
ESS-CHAD TOTAL SCORE: 2
HOW LIKELY ARE YOU TO NOD OFF OR FALL ASLEEP WHILE SITTING QUIETLY AFTER LUNCH WITHOUT ALCOHOL: WOULD NEVER DOZE
HOW LIKELY ARE YOU TO NOD OFF OR FALL ASLEEP WHILE LYING DOWN TO REST IN THE AFTERNOON WHEN CIRCUMSTANCES PERMIT: SLIGHT CHANCE OF DOZING
HOW LIKELY ARE YOU TO NOD OFF OR FALL ASLEEP WHILE WATCHING TV: SLIGHT CHANCE OF DOZING
SITING INACTIVE IN A PUBLIC PLACE LIKE A CLASS ROOM OR A MOVIE THEATER: WOULD NEVER DOZE
HOW LIKELY ARE YOU TO NOD OFF OR FALL ASLEEP WHILE SITTING AND TALKING TO SOMEONE: WOULD NEVER DOZE
HOW LIKELY ARE YOU TO NOD OFF OR FALL ASLEEP IN A CAR, WHILE STOPPED FOR A FEW MINUTES IN TRAFFIC: WOULD NEVER DOZE
HOW LIKELY ARE YOU TO NOD OFF OR FALL ASLEEP WHEN YOU ARE A PASSENGER IN A CAR FOR AN HOUR WITHOUT A BREAK: WOULD NEVER DOZE
HOW LIKELY ARE YOU TO NOD OFF OR FALL ASLEEP WHILE SITTING AND READING: WOULD NEVER DOZE

## 2024-06-27 NOTE — PATIENT INSTRUCTIONS
"Thank you for coming to the Sleep Medicine Clinic today! Your sleep medicine provider today was: Annabelle Cerda MD Below is a summary of your treatment plan, patient education, other important information, and our contact numbers.      TREATMENT PLAN     - Changed machine settings in clinic today. Please use your machine every night all night.   - Please read the \"Patient Education\" section below for more detailed information. Try implementing tips, reminders, strategies, and supportive management.   - If not yet done, please sign up for [employer] to make a future schedule, send prescription requests, or send messages.    Follow-up Appointment:   Follow-up in 1 year.    PATIENT EDUCATION     OBSTRUCTIVE SLEEP APNEA (JS) is a sleep disorder where your upper airway muscles relax during sleep and the airway intermittently and repetitively narrows and collapses leading to partially blocked airway (hypopnea) or completely blocked airway (apnea) which, in turn, can disrupt breathing in sleep, lower oxygen levels while you sleep and cause night time wakings. Because both apnea and hypopnea may cause higher carbon dioxide or low oxygen levels, untreated JS can lead to heart arrhythmia, elevation of blood pressure, and make it harder for the body to consolidate memory and facilitate metabolism (leading to higher blood sugars at night). Frequent partial arousals occur during sleep resulting in sleep deprivation and daytime sleepiness. JS is associated with an increased risk of cardiovascular disease, stroke, hypertension, and insulin resistance. Moreover, untreated JS with excessive daytime sleepiness can increase the risk of motor vehicular accidents.    Below are conservative strategies for JS regardless of JS severity are:   Positional therapy - Avoid sleeping on your back.   Healthy diet and regular exercise to optimize weight is highly encouraged.   Avoid alcohol late in the evening and sedative-hypnotics as these " substances can make sleep apnea worse.   Improve breathing through the nose with intranasal steroid spray, saline rinse, or antihistamines    Safety: Avoid driving vehicle and operating heavy equipment while sleepy. Drowsy driving may lead to life-threatening motor vehicle accidents. A person driving while sleepy is 5 times more likely to have an accident. If you feel sleepy, pull over and take a short power nap (sleep for less than 30 minutes). Otherwise, ask somebody to drive you.    Treatment options for sleep apnea include weight management, positional therapy, Positive Airway Therapy (PAP) therapy, oral appliance therapy, hypoglossal nerve stimulator (Inspire) and select airway surgeries.    Annual Reminders About Your Sleep Apnea    Your sleep apnea is well controlled based on reviewing your PAP Data Report.     General Reminders:  Continue current machine settings. Continue using machine every night. Need at least 4 hours daily usage.   Remember to clean your mask, tubings, and water chamber regularly as instructed.   Know the replacement schedule of your supplies/ accessories and contact your DME (durable medical equipment) provider if you are due for them.   Avoid driving or operating heavy machinery when drowsy. A person driving while sleepy is 5 times more likely to have an accident. If you feel sleepy, pull over and take a short power nap (sleep for less than 30 minutes). Otherwise, ask somebody to drive you.    Follow-up sooner through [facility] or calling our office if you have any of the following symptoms:  Snoring or stopping breathing while using the machine  Recurrence of fatigue, sleepiness, insomnia, and other symptoms you had prior using machine  Persistent or worsening fatigue or sleepiness despite regular use of machine  Issues tolerating the machine like bloating sensation, air hunger, too much hot air, too much pressure, taking off mask without recall in the middle of sleep, etc.     Other  conservative measures to improve sleep apnea:  Losing weight can lead to some improvement of JS which means you will need lower pressures in machine to control your JS. In some patients, they don't need to use the machine after bariatric surgery. Hence, consider medical and/or surgical weight loss especially if your BMI is more than 35.  Avoiding alcohol, sedative-hypnotics, and sedating medications is imperative as these substances can worsen snoring and sleep apnea  If you have nasal congestion or seasonal allergies, improving your breathing through the nose is critical for treating sleep apnea, tolerating CPAP, and improving your sleep; hence, using intranasal steroid spray like Flonase might help. Make sure you know the proper way to use it.  Stay off your back when sleeping.    Common issues with PAP machine:  Mask gets dislodged when turning to the side: Consider getting a CPAP pillow or switching to a mask with hose on top.     Dry mouth:  Your machine has built-in humidifier that heats up the air to prevent dry mouth. It can be adjusted to your comfort. You can try that first and increase setting one level one night at a time to check which setting is comfortable and effective in lessening dry mouth. In some patients with heated hose, adjusting tube temperature to make air warmer can improve dry mouth. If dry mouth persists despite adjusting humidity or tube temperature setting, may apply OTC Biotene gel over the gums at bedtime.  If Biotene gel is not effective, consider trying XEROSTOM gel from [employer].  Also, eliminate or reduce dose of medications that can cause dry mouth if possible. Lastly, may try getting a separate room humidifier machine.    Airleaks: Please call DME as they may need to adjust your mask or refit you with a different kind or different size of mask. In addition, you can ask DME for tips on getting a good mask seal and mask fit.     Difficulty tolerating the mask: Contact your DME  "to try a different kind of mask and/or call office to get a referral to Sleep Psychologist for CPAP desensitization. CPAP desensitization technique is a set of strategies that helps patient cope with claustrophobia and anxiety related to wearing mask. Alternatively, we can do a daytime mini-sleep study called PAP-nap trial wherein you will try on different kinds of mask and the sleep technician will try different pressure settings on CPAP and BPAP machines to see which specific pressure is tolerable and comfortable for you.     Water droplets or moisture within the hose and/or mask: This is called rain-out and it is caused by condensation of too much heated humidity on the cooler walls of the hose. If you have rain-out, turn down humidity settings or get a heated hose. If you already have a heated hose, turn up the \"tube temperature\" of the heated hose. Alternatively, if you don't want to get a heated hose or warmer air, may wrap the CPAP hose with stockings to keep it somewhat warm. Also, you need to place the machine on the floor and lower the hose so that water won't travel upward towards your mask.     Maintaining your CPAP/BPAP device:    The humidification chamber (aka water tank or water chamber) needs to be filled with distilled water to prevent buildup of white deposits in the future. If you cannot find distilled water, you can use tap water but expect to have white deposits buildup seen after prolonged use with tap water. If you start seeing white deposits on the water chamber, you can clean it by filling it with equal parts of distilled white vinegar and water. Let the vinegar-water mixture sit for 2 hours, and then rinse it with running tap water. Clean with soap and water then let it dry.     You should try to keep your machine clean in order to work well. Here are some tips to clean PAP supplies / accessories:    Clean the humidification chamber (aka water tank) as well as your mask and tubing at least " once a week with soap and water.   Alternatively, you can fill a sink or basin with warm water and add a little mild detergent, like Ivory dish soap. Gently wipe your supplies with the soapy water to free all the oils and dirt that may have collected. Once that's done, rinse these items with clean water until the soap is gone and let them air dry. You can hang your tubing over the curtain pema in your bathroom so that it dries.  The mask insert (part of the mask that has contact with your skin) needs to be cleaned with soap and water daily. Another option is to wipe them down with CPAP wipes or baby wipes.    You should replace your mask and tubing frequently in order to prevent bacteria buildup, machine damage, and mask seal issues. The older the mask and hose, the high likelihood that there is bacteria buildup in it especially if they are not cleaned regularly. Dirty filters damage machines because build-up of dust and contaminants can cause machine to over-heat, and in time, damage the motor of machine. Cushions lose their seal over time as most masks are made of plastic and silicone while headgear is made of neoprene. These materials will break down with age and frequent use. Here is the recommended replacement schedule for PAP supplies / accessories:    Twice a month- disposable filters and cushions for nasal mask or nasal pillows.  Once a month- cushion for full face mask  Every 3 months- mask with headgear and PAP tubing (standard or heated hose)  Every 6 months- reusable filter, water chamber, and chin strap     Other useful information:    Some people do not put water in the tank while other people prefers to put water in the tank to prevent mouth dryness. Try to experiment to determine which is more comfortable for you.   In general, new machines have 2 years warranty on parts while health insurance allows you to have a new machine once every 5 years.     You can also go to the following EDUCATION WEBSITES  for further information:   American Academy of Sleep Medicine http://sleepeducation.org  National Sleep Foundation: https://sleepfoundation.org  American Sleep Apnea Association: https://www.sleepapnea.org (for patients with sleep apnea)  Narcolepsy Network: https://www.narcolepsynetwork.org (for patients with narcolepsy)  AFARlepsy inc: https://www.Semantriacolepsy.org (for patients with narcolepsy)  Hypersomnia Foundation: https://www.hypersomniafoundation.org (for patients with idiopathic hypersomnia)  RLS foundation: https://www.rls.org (for patients with restless leg syndrome)    IMPORTANT INFORMATION     Call 911 for medical emergencies.  Our offices are generally open from Monday-Friday, 8 am - 5 pm.   There are no supporting services by either the sleep doctors or their staff on weekends and Holidays, or after 5 PM on weekdays.   If you need to get in touch with me, you may either call my office number or you can use [employer].  If a referral for a test, for CPAP, or for another specialist was made, and you have not heard about scheduling this within a week, please call scheduling at [phone] ([phone]).  If you are unable to make your appointment for clinic or an overnight study, kindly call the office or sleep testing center at least 48 hours in advance to cancel and reschedule.  If you are on CPAP, please bring your device's card and/or the device to each clinic appointment.   In case of problems with PAP machine or mask interface, please contact your [employer] (Durable Medical Equipment) company first. [employer] is the company who provides you the machine and/or PAP supplies.       PRESCRIPTIONS     We require 7 days advanced notice for prescription refills. If we do not receive the request in this time, we cannot guarantee that your medication will be refilled in time.    IMPORTANT PHONE NUMBERS     Sleep Medicine Clinic Fax: [phone]  Appointments (for Pediatric Sleep Clinic): [phone] - option  1  Appointments (for Adult Sleep Clinic): [phone] - option 2  Appointments (For Sleep Studies): [phone] - option 3  Behavioral Sleep Medicine: [phone]  Sleep Surgery: [phone]  Nutrition Service: [phone]  Weight management clinics with endocrinology: [phone]  Bariatric Services: [phone] (includes weight loss medications and weight loss surgery)  [facility]: [phone] (offers holistic approaches to weight management)  ENT (Otolaryngology): [phone]  Headache Clinic (Neurology): [phone]  Neurology: [phone]  Psychiatry: [phone]  Pulmonary Function Testing (PFT) Center: [phone]  Pulmonary Medicine: [phone]  [employer] ([facility]): [phone]      OUR SLEEP TESTING LOCATIONS     Our team will contact you to schedule your sleep study, however, you can contact us as follow:  Main Phone Line (scheduling only): [phone]), option 3  Adult and Pediatric Locations  [facility] (6 years and older): [facility] - 4th floor ([address] [place] [address]) After hours line: [phone]  [facility] (Main campus: All ages): [facility], 6th floor. After hours line: [phone]   [place] (5 years and older; younger considered on case-by-case basis): [address]; [address], [address]. Scheduling  After hours line: [phone]   [place] (6 years and older): [address]; [address]; [address]   [place] (6 years and older): [address], [address]  After hours line: [phone]   [place] (13 years and older) in [place]: [address], 2nd floor  After hours line: [phone]   [place] (13 year and older): [address]  After hours line: [phone]     Adult Only Locations:   [place] (18 years and older): [address] [facility], 2nd floor   [place] (18 years and older): [address]; 4th floor  After hours line: [phone]   [place] (18 years and older) at [place]: [address] [facility]  After hours line: [phone]     CONTACTING YOUR SLEEP MEDICINE PROVIDER AND SLEEP TEAM      For issues with your machine or mask interface, please call your DME provider first. DME stands for durable medical company. DME is the company who provides you the machine and/or PAP supplies / accessories.   To schedule, cancel, or reschedule SLEEP STUDY APPOINTMENTS, please call the Main Phone Line at [phone] - option 3.   To schedule, cancel, or reschedule CLINIC APPOINTMENTS, you can do it in \"[employer]\", call [phone] (direct line) to speak with my practice lead (Gabi Woods), or call the Main Phone Line at [phone] - option 2  For CLINICAL QUESTIONS or MEDICATION REFILLS, please call direct line for Adult Sleep Nurses at [phone].   Lastly, you can also send a message directly to your provider through \"My Chart\", which is the email service through your  Records Account: [email or web address]       Here at [facility], we wish you a restful sleep!    "

## 2024-06-27 NOTE — PROGRESS NOTES
"[facility] SLEEP MEDICINE CLINIC  FOLLOW-UP VISIT NOTE    PCP: Sharan Alvarez MD    HISTORY OF PRESENT ILLNESS     Patient ID: Sarthak Prado \"Heidy" is a 59 y.o. male who presents for follow-up of JS after new machine setup.     Patient is here alone today.  To review, patient's medical history is notable for epilepsy and JS on CPAP      Interval History  Patient was last seen in 2024. Plan was to get a new machine to replace old machine .  Since last visit, patient has been using machine every night. Current mask interface being used is Respironics Jazzmine view full face mask. Per records, patient is getting supplies thru [employer]  Patient denies machine problems, mask leak, aerophagia, dry mouth, skin irritation, and nasal congestion. Complains of air hunger at sleep onset despite caleb feature turned off.  The following are patient's perceived benefits of PAP: no snoring on PAP, refreshing sleep, and decreased daytime sleepiness and/or fatigue    RLS Follow-up:   Denies    SLEEP STUDY HISTORY (personally reviewed raw data such as interpretation report, data sheet, hypnogram, and titration table if available and applicable)  PSG 2009: AHI 4.4  PAP titration 2009: CPAP 8 cm H2O was recommended  HSAT 2016: AHI 8.7, SpO2 aida 86%, spent 11.7 mins with SpO2<89%    SLEEP-WAKE SCHEDULE  Bedtime:  10:30 PM daily  Subjective sleep latency: 15 minutes  Number of awakenings:  3 times per night due to stress  Nocturia: No  Falls back asleep in 5 minutes to 60-90 minutes  Final wake time:  7:30 AM daily  Out of bed time:  7:30 AM daily  Shift work: No  Naps: no  Average sleep duration (excluding naps): 7-8 hours    SLEEP ENVIRONMENT  Sleep location: bed  Sleep status: sleeps with wife  Preferred sleep position: side    SOCIAL HISTORY  Smokin.5 pack per week  ETOH: 1-2 drinks 3x per month  Marijuana: no  Caffeine: 3 cups coffee daily  Sleep aids: no    WEIGHT: " "stable    Claustrophobia: No     REVIEW OF SYSTEMS     All other systems have been reviewed and are negative.    ALLERGIES     Allergies   Allergen Reactions    Penicillins Unknown    Tetanus Vaccines And Toxoid Unknown       MEDICATIONS     Current Outpatient Medications   Medication Sig Dispense Refill    lamoTRIgine (LaMICtal) 200 mg tablet Take 1 tablet (200 mg) by mouth 2 times a day. 180 tablet 3     No current facility-administered medications for this visit.       Active Problems, Allergy List, Medication List, and PMH/PSH/FH/Social Hx have been reviewed and reconciled in chart. No significant changes unless documented in the pertinent chart section. Updates made when necessary.       PHYSICAL EXAM     VITAL SIGNS: /87   Pulse 98   Temp 36.3 °C (97.3 °F)   Resp 16   Ht 1.778 m (5' 10\")   Wt 97.3 kg (214 lb 8 oz)   SpO2 96%   BMI 30.78 kg/m²     NECK CIRCUMFERENCE: 17.75 inches    Today ESS: 2  Last visit ESS: 3  LALA: 8    Physical Exam  Constitutional: Awake, not in distress  Skin: Warm, no rash  Neuro: No tremors, moves all extremities  Psych: alert and oriented to time, place, and person    RESULTS/DATA     No results found for: \"IRON\", \"TRANSFERRIN\", \"IRONSAT\", \"TIBC\", \"FERRITIN\"    Bicarbonate   Date Value Ref Range Status   06/15/2024 27 21 - 32 mmol/L Final       PAP Adherence  A PAP adherence data was obtained and reviewed personally today in clinic. (see scanned document in EPIC)      ASSESSMENT/PLAN     Sarthak Prado \"Pawan\" is a 59 y.o. male who returns in [facility] for the following problems:    OBSTRUCTIVE SLEEP APNEA, MILD (HSAT AHI 8.7)  SLEEP-RELATED HYPOXEMIA  - Now on auto-CPAP 5-15 cm H2O and EPR 3  - Doing well with improved JS symptoms.   - PAP adherence data reviewed today. Usage days 30/30, days> 4 hours at 100%, residual AHI 1.3.   - Due to air hunger at sleep onset despite ramp off, changed settings in clinic today to auto-CPAP 7-15 " cm H2O. Advised patient to continue using machine every night all night and if napping more than 30 minutes.  - Continue supportive management as follows: Lose weight. Stay off your back when sleeping. Avoid smoking, alcohol, and sedating medications if applicable. Don't drive when sleepy.    EUSTACHIAN TUBE DYSFUNCTION  - Consider trying fluticasone nasal spray 2 sprays per nostril once daily 1 hour before bedtime.  - Educated patient on proper use of intranasal sprays.     OBESITY  - Recommend weight loss with diet and exercise.  - Weight loss can help in long term management of JS.  - Defer management to PCP.    EPILEPSY  - Seizures occur 2x a month but the grand mal seizures was 12 years ago    Follow-up in 1 year.    All of patient's questions were answered. He verbalizes understanding and agreement with my assessment and plan. Refer to after-visit-summary (AVS) for patient education and if applicable, for more details on sleep study preparation, troubleshooting issues with PAP usage, proper cleaning instructions of PAP supplies, and usual recommended replacement schedule for each of the PAP supplies.

## 2024-07-09 ENCOUNTER — APPOINTMENT (OUTPATIENT)
Dept: PRIMARY CARE | Facility: CLINIC | Age: 59
End: 2024-07-09
Payer: COMMERCIAL

## 2024-07-09 ENCOUNTER — LAB (OUTPATIENT)
Dept: LAB | Facility: LAB | Age: 59
End: 2024-07-09
Payer: COMMERCIAL

## 2024-07-09 VITALS
HEIGHT: 70 IN | BODY MASS INDEX: 30.92 KG/M2 | HEART RATE: 84 BPM | DIASTOLIC BLOOD PRESSURE: 84 MMHG | WEIGHT: 216 LBS | SYSTOLIC BLOOD PRESSURE: 130 MMHG

## 2024-07-09 DIAGNOSIS — Z00.00 ENCOUNTER FOR PREVENTIVE HEALTH EXAMINATION: ICD-10-CM

## 2024-07-09 DIAGNOSIS — Z11.59 ENCOUNTER FOR HEPATITIS C SCREENING TEST FOR LOW RISK PATIENT: ICD-10-CM

## 2024-07-09 DIAGNOSIS — J32.0 CHRONIC MAXILLARY SINUSITIS: ICD-10-CM

## 2024-07-09 DIAGNOSIS — Z00.00 ENCOUNTER FOR PREVENTIVE HEALTH EXAMINATION: Primary | ICD-10-CM

## 2024-07-09 DIAGNOSIS — J18.9 PNEUMONIA OF BOTH LOWER LOBES DUE TO INFECTIOUS ORGANISM: ICD-10-CM

## 2024-07-09 DIAGNOSIS — Q28.3 CONGENITAL ANOMALY OF CEREBROVASCULAR SYSTEM (HHS-HCC): ICD-10-CM

## 2024-07-09 LAB
ALBUMIN SERPL BCP-MCNC: 4.4 G/DL (ref 3.4–5)
ALP SERPL-CCNC: 80 U/L (ref 33–120)
ALT SERPL W P-5'-P-CCNC: 28 U/L (ref 10–52)
ANION GAP SERPL CALC-SCNC: 13 MMOL/L (ref 10–20)
AST SERPL W P-5'-P-CCNC: 20 U/L (ref 9–39)
BILIRUB SERPL-MCNC: 0.6 MG/DL (ref 0–1.2)
BUN SERPL-MCNC: 12 MG/DL (ref 6–23)
CALCIUM SERPL-MCNC: 9.5 MG/DL (ref 8.6–10.3)
CHLORIDE SERPL-SCNC: 105 MMOL/L (ref 98–107)
CHOLEST SERPL-MCNC: 202 MG/DL (ref 0–199)
CHOLESTEROL/HDL RATIO: 3.8
CO2 SERPL-SCNC: 26 MMOL/L (ref 21–32)
CREAT SERPL-MCNC: 0.95 MG/DL (ref 0.5–1.3)
EGFRCR SERPLBLD CKD-EPI 2021: >90 ML/MIN/1.73M*2
ERYTHROCYTE [DISTWIDTH] IN BLOOD BY AUTOMATED COUNT: 13.8 % (ref 11.5–14.5)
GLUCOSE SERPL-MCNC: 95 MG/DL (ref 74–99)
HCT VFR BLD AUTO: 46.6 % (ref 41–52)
HDLC SERPL-MCNC: 53.1 MG/DL
HGB BLD-MCNC: 15 G/DL (ref 13.5–17.5)
LDLC SERPL CALC-MCNC: 130 MG/DL
MCH RBC QN AUTO: 29.3 PG (ref 26–34)
MCHC RBC AUTO-ENTMCNC: 32.2 G/DL (ref 32–36)
MCV RBC AUTO: 91 FL (ref 80–100)
NON HDL CHOLESTEROL: 149 MG/DL (ref 0–149)
NRBC BLD-RTO: 0 /100 WBCS (ref 0–0)
PLATELET # BLD AUTO: 354 X10*3/UL (ref 150–450)
POTASSIUM SERPL-SCNC: 4.3 MMOL/L (ref 3.5–5.3)
PROT SERPL-MCNC: 7.1 G/DL (ref 6.4–8.2)
PSA SERPL-MCNC: 2.2 NG/ML
RBC # BLD AUTO: 5.12 X10*6/UL (ref 4.5–5.9)
SODIUM SERPL-SCNC: 140 MMOL/L (ref 136–145)
TRIGL SERPL-MCNC: 96 MG/DL (ref 0–149)
TSH SERPL-ACNC: 1.08 MIU/L (ref 0.44–3.98)
VLDL: 19 MG/DL (ref 0–40)
WBC # BLD AUTO: 6.4 X10*3/UL (ref 4.4–11.3)

## 2024-07-09 PROCEDURE — 84153 ASSAY OF PSA TOTAL: CPT

## 2024-07-09 PROCEDURE — 83036 HEMOGLOBIN GLYCOSYLATED A1C: CPT

## 2024-07-09 PROCEDURE — 86803 HEPATITIS C AB TEST: CPT

## 2024-07-09 PROCEDURE — 80053 COMPREHEN METABOLIC PANEL: CPT

## 2024-07-09 PROCEDURE — 99386 PREV VISIT NEW AGE 40-64: CPT | Performed by: STUDENT IN AN ORGANIZED HEALTH CARE EDUCATION/TRAINING PROGRAM

## 2024-07-09 PROCEDURE — 85027 COMPLETE CBC AUTOMATED: CPT

## 2024-07-09 PROCEDURE — 84443 ASSAY THYROID STIM HORMONE: CPT

## 2024-07-09 PROCEDURE — 80061 LIPID PANEL: CPT

## 2024-07-09 PROCEDURE — 99203 OFFICE O/P NEW LOW 30 MIN: CPT | Performed by: STUDENT IN AN ORGANIZED HEALTH CARE EDUCATION/TRAINING PROGRAM

## 2024-07-09 PROCEDURE — 36415 COLL VENOUS BLD VENIPUNCTURE: CPT

## 2024-07-09 NOTE — PROGRESS NOTES
"Subjective   Patient ID: Sarthak Prado \"Heidy" is a 59 y.o. male who presents for Hospital Follow-up.    HPI  Diet is well balanced.  Exercises regularly.  Colon cancer screening completed. Due next year  Vaccines are up to date.  Dental exam is up to date.  Vision exam is up to date.  Patient is fasting for labs today.  Social: Tobacco use-  active smoker, 1 pack/week     Alcohol use- 1 drink/week    Other concerns addressed today include:     Pneumonia  Patient treated for pneumonia in the ED 2 weeks ago with 3 days of azithromycin. Patient here for follow up. He complains of mild pain in the upper right chest. No dypnea or cough.    Fall on left collarbone  He also had a fall and injured his left collarbone 2 weeks ago. He complains of dull pain but no restriction of range of movements. No evidence of fracture.    Right ear and sinus congestion  Patient has on and off congestion of right sided sinuses, nasal congestion and right ear pain. No decrease in hearing, tinnitus.    Adult onset epilepsy  Patient has a history of adult onset epilepsy for which he takes Lamotrigine 200mg BID. Patient feels seizures exacerbated by stress and since his half-way, he feels his seizure episodes have come down in frequency. Sees neurology.      Review of Systems  All pertinent positive symptoms are included in the history of present illness.    All other systems have been reviewed and are negative and noncontributory to this patient's current ailments.     Social History     Tobacco Use    Smoking status: Some Days     Types: Cigarettes    Smokeless tobacco: Never   Substance Use Topics    Alcohol use: Not on file      Past Surgical History:   Procedure Laterality Date    MOUTH SURGERY  09/02/2015    Oral Surgery Tooth Extraction      Allergies   Allergen Reactions    Penicillins Unknown    Tetanus Vaccines And Toxoid Unknown        Current Outpatient Medications   Medication Sig Dispense Refill    lamoTRIgine (LaMICtal) " "200 mg tablet Take 1 tablet (200 mg) by mouth 2 times a day. 180 tablet 3     No current facility-administered medications for this visit.        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[facility])      Immunization History   Administered Date(s) Administered    Pfizer Purple Cap SARS-CoV-2 03/14/2021, 04/04/2021, 11/28/2021       Objective   VITALS  /84   Pulse 84   Ht 1.778 m (5' 10\")   Wt 98 kg (216 lb)   BMI 30.99 kg/m²      Physical Exam  CONSTITUTIONAL - well nourished, well developed, looks like stated age, in no acute distress, not ill-appearing, and not tired appearing  SKIN - normal skin color and pigmentation, normal skin turgor without rash, lesions, or nodules visualized  HEAD - no trauma, normocephalic  EYES - pupils are equal and reactive to light, extraocular muscles are intact, and normal external exam  ENT - TM's intact, no injection, no signs of infection, uvula midline, normal tongue movement and throat normal, no exudate, nasal passage without discharge and patent  NECK - supple without rigidity, no neck mass was observed, no thyromegaly or thyroid nodules  CHEST - clear to auscultation, no wheezing, no crackles and no rales, good effort  CARDIAC - regular rate and regular rhythm, no skipped beats, no murmur  ABDOMEN - no organomegaly, soft, nontender, nondistended, normal bowel sounds, no guarding/rebound/rigidity, negative McBurney sign and negative Lema sign  EXTREMITIES - no edema, no deformities  NEUROLOGICAL - normal gait, normal balance, normal motor, no ataxia, DTRs equal and symmetrical; alert, oriented and no focal signs  PSYCHIATRIC - alert, pleasant and cordial, age-appropriate  IMMUNOLOGIC - no cervical lymphadenopathy     Recent Results (from the past 2016 hour(s))   CBC and Auto Differential    Collection Time: 06/15/24 10:42 AM   Result Value Ref Range    WBC 5.6 " 4.4 - 11.3 x10*3/uL    nRBC 0.0 0.0 - 0.0 /100 WBCs    RBC 4.84 4.50 - 5.90 x10*6/uL    Hemoglobin 14.6 13.5 - 17.5 g/dL    Hematocrit 44.4 41.0 - 52.0 %    MCV 92 80 - 100 fL    MCH 30.2 26.0 - 34.0 pg    MCHC 32.9 32.0 - 36.0 g/dL    RDW 13.7 11.5 - 14.5 %    Platelets 285 150 - 450 x10*3/uL    Neutrophils % 67.3 40.0 - 80.0 %    Immature Granulocytes %, Automated 0.2 0.0 - 0.9 %    Lymphocytes % 19.6 13.0 - 44.0 %    Monocytes % 9.4 2.0 - 10.0 %    Eosinophils % 3.0 0.0 - 6.0 %    Basophils % 0.5 0.0 - 2.0 %    Neutrophils Absolute 3.78 1.20 - 7.70 x10*3/uL    Immature Granulocytes Absolute, Automated 0.01 0.00 - 0.70 x10*3/uL    Lymphocytes Absolute 1.10 (L) 1.20 - 4.80 x10*3/uL    Monocytes Absolute 0.53 0.10 - 1.00 x10*3/uL    Eosinophils Absolute 0.17 0.00 - 0.70 x10*3/uL    Basophils Absolute 0.03 0.00 - 0.10 x10*3/uL   Comprehensive metabolic panel    Collection Time: 06/15/24 10:42 AM   Result Value Ref Range    Glucose 98 74 - 99 mg/dL    Sodium 141 136 - 145 mmol/L    Potassium 4.0 3.5 - 5.3 mmol/L    Chloride 107 98 - 107 mmol/L    Bicarbonate 27 21 - 32 mmol/L    Anion Gap 11 10 - 20 mmol/L    Urea Nitrogen 14 6 - 23 mg/dL    Creatinine 0.88 0.50 - 1.30 mg/dL    eGFR >90 >60 mL/min/1.73m*2    Calcium 9.7 8.6 - 10.3 mg/dL    Albumin 4.3 3.4 - 5.0 g/dL    Alkaline Phosphatase 71 33 - 120 U/L    Total Protein 7.1 6.4 - 8.2 g/dL    AST 19 9 - 39 U/L    Bilirubin, Total 0.6 0.0 - 1.2 mg/dL    ALT 26 10 - 52 U/L   Troponin I, High Sensitivity    Collection Time: 06/15/24 10:42 AM   Result Value Ref Range    Troponin I, High Sensitivity 3 0 - 20 ng/L   ECG 12 lead    Collection Time: 06/15/24 10:48 AM   Result Value Ref Range    Ventricular Rate 68 BPM    Atrial Rate 68 BPM    WA Interval 178 ms    QRS Duration 84 ms    QT Interval 412 ms    QTC Calculation(Bazett) 438 ms    P Axis 16 degrees    R Axis 1 degrees    T Axis 34 degrees    QRS Count 11 beats    Q Onset 213 ms    P Onset 124 ms    P Offset 179 ms     T Offset 419 ms    QTC Fredericia 429 ms        Assessment/Plan   Diagnoses and all orders for this visit:  Encounter for preventive health examination  A complete history and physical was performed today.  Vaccines are up to date.  Colon cancer screening is up to date.  Fasting labs ordered today include:  -     CBC; Future  -     Comprehensive Metabolic Panel; Future  -     Hemoglobin A1C; Future  -     Lipid Panel; Future  -     TSH with reflex to Free T4 if abnormal; Future  -     Prostate Specific Antigen; Future  Encounter for hepatitis C screening test for low risk patient  -     Hepatitis C Antibody; Future  Pneumonia of both lower lobes due to infectious organism  Status post azithromycin treatment, feeling better  Will check chest x-ray for resolution  -     XR chest 2 views; Future  Chronic maxillary sinusitis  Recommend starting Flonase and referral to ENT  -     Referral to ENT; Future  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

## 2024-07-10 ENCOUNTER — APPOINTMENT (OUTPATIENT)
Dept: DERMATOLOGY | Facility: CLINIC | Age: 59
End: 2024-07-10
Payer: COMMERCIAL

## 2024-07-10 VITALS — HEART RATE: 91 BPM | DIASTOLIC BLOOD PRESSURE: 89 MMHG | SYSTOLIC BLOOD PRESSURE: 147 MMHG

## 2024-07-10 DIAGNOSIS — C44.622 SQUAMOUS CELL CANCER OF SKIN OF RIGHT HAND: ICD-10-CM

## 2024-07-10 LAB
EST. AVERAGE GLUCOSE BLD GHB EST-MCNC: 123 MG/DL
HBA1C MFR BLD: 5.9 %
HCV AB SER QL: NONREACTIVE

## 2024-07-10 PROCEDURE — 99204 OFFICE O/P NEW MOD 45 MIN: CPT | Performed by: DERMATOLOGY

## 2024-07-10 PROCEDURE — 17311 MOHS 1 STAGE H/N/HF/G: CPT | Performed by: DERMATOLOGY

## 2024-07-10 NOTE — PROGRESS NOTES
Mohs Surgery Operative Note    Date of Surgery:  7/10/2024  Surgeon:  Matt Trinidad MD  Office Location: [address]  [facility] [address]  Dept: [phone]  Dept Fax: [phone]  Referring Provider: Silver Norris MD  [address] [place] [address] [address]  [place] [address]      Assessment/Plan   Pre-procedure:   Obtained informed consent: written from patient  The surgical site was identified and confirmed with the patient.     Intra-operative:   Audible time out called at : 11:26 AM 07/10/24  by: Lyric Harrison RN   Verified patient name, birthdate, site, specimen bottle label & requisition.    The planned procedure(s) was again reviewed with the patient. The risks of bleeding, infection, nerve damage and scarring were reviewed. Written authorization was obtained. The patient identity, surgical site, and planned procedure(s) were verified. The provider acted as both surgeon and pathologist.     Squamous cell cancer of skin of right hand  Right radial dorsal Hand    Mohs surgery    Consent obtained: written    Universal Protocol:  Procedure explained and questions answered to patient or proxy's satisfaction: Yes    Test results available and properly labeled: Yes    Pathology report reviewed: Yes    External notes reviewed: Yes    Photo or diagram used for site identification: Yes    Site/side marked: Yes    Slide independently reviewed by Mohs surgeon: Yes    Immediately prior to procedure a time out was called: Yes    Patient identity confirmed: verbally with patient  Preparation: Patient was prepped and draped in usual sterile fashion      Anticoagulation:  Is the patient taking prescription anticoagulant and/or aspirin prescribed/recommended by a physician? No    Was the anticoagulation regimen changed prior to Mohs? No      Anesthesia:  Anesthesia method: local infiltration  Local anesthetic: lidocaine 1% WITH epi    Procedure Details:  Biopsy  accession number: [id number]  Date of biopsy: 4/24/2024  Pre-Op diagnosis: squamous cell carcinoma  SCC subtype: well differentiated.  Surgery side: right  Surgical site (from skin exam): Right radial dorsal Hand  Pre-operative length (cm): 2.2  Pre-operative width (cm): 1  Indications for Mohs surgery: anatomic location where tissue conservation is critical and tumor size greater than 2 cm  Previously treated? No      Micrographic Surgery Details:  Post-operative length (cm): 2.4  Post-operative width (cm): 1.2  Number of Mohs stages: 1    Stage 1     Comments: The patient was brought into the operating room and placed in the procedure chair in the appropriate position.  The area positive by previous biopsy was identified and confirmed with the patient. The area of clinically obvious tumor was debulked using a curette and/or scalpel as needed. An incision was made following the Mohs approach through the skin. The specimen was taken to the lab, divided into 2 piece(s) and appropriately chromacoded and processed.         Tumor features identified on Mohs section: no tumor identified    Depth of defect: subcutaneous fat    Patient tolerance of procedure: tolerated well, no immediate complications    Reconstruction:  Was the defect reconstructed?: No    Fine/surface layer approximation (top stitches)   Hemostasis achieved with: Gelfoam  Outcome: patient tolerated procedure well with no complications    Post-procedure details: sterile dressing applied and wound care instructions given    Dressing type: Gelfoam and pressure dressing    Additional details:  Repair: After a discussion with the patient regarding the options for wound closure, a decision was made to proceed with second intention healing.  Dressing F/U: Surgifoam was placed in the wound. A pressure dressing was placed to help stabilize the wound and to minimize the risk of postoperative bleeding. Wound care was discussed, and the patient was given written  post-operative wound care instructions.            The final repair measured 2.4 x1.2 cm    Wound care was discussed, and the patient was given written post-operative wound care instructions.      The patient will follow up with Matt Trinidad MD as needed for any post operative problems or concerns, and will follow up with their primary dermatologist as scheduled.

## 2024-07-10 NOTE — PROGRESS NOTES
"Office Visit Note  Date: 7/10/2024  Surgeon:  Matt Trinidad MD  Office Location:  [address]  [facility] [address]  Dept: [phone]  Dept Fax: [phone]  Referring Provider: Silver Norris MD  [address] [place] [address] [address]  [place] [address]    Subjective   Sarthak Prado \"Heidy" is a 59 y.o. male who presents for the following: MOHS Surgery    According to the patient, the lesion has been present for approximately 6 months at the time of diagnosis.  The lesion is not causing symptoms.  The lesion has not been treated previously.    The patient does not have a pacemaker / defibrillator.  The patient does not have a heart valve / joint replacement.    The patient is not on blood thinners.  The patient does not have a history of hepatitis B or C.  The patient does not have a history of HIV.  The patient does not have a history of immunosuppression (e.g. organ transplantation, malignancy, medications)    Review of Systems:  No other skin or systemic complaints other than what is documented elsewhere in the note.    MEDICAL HISTORY: clinically relevant history including significant past medical history, medications and allergies was reviewed and documented in Epic.    Objective   Well appearing patient in no apparent distress; mood and affect are within normal limits.  Vital signs: See record.  Noted on the Right radial dorsal Hand  Is a 2.2 x 1.0 cm scar        The patient confirmed the identified site.    Discussion:  The nature of the diagnosis was explained. The lesion is a skin cancer.  It has a risk of local growth and distant spread. The condition is associated with sun exposure.  Warning signs of non-melanoma skin cancer discussed. Patient was instructed to perform monthly self skin examination.  We recommended that the patient have regular full skin exams given an increased risk of subsequent skin cancers. The patient was instructed to use " sun protective behaviors including use of broad spectrum sunscreens and sun protective clothing to reduce risk of skin cancers.      Risks, benefits, side effects of Mohs surgery were discussed with patient and the patient voiced understanding.  It was explained that even though the cure rate of Mohs is very high it is not 100%. Risks of surgery including but not limited to bleeding, infection, numbness, nerve damage, and scar were reviewed.  Discussion included wound care requirements, activity restrictions, likely scar outcome and time to heal.     After Mohs surgery, the defect may need to be repaired surgically and the scar may be longer than the original lesion.  Reconstruction options, risks, and benefits were reviewed including second intention healing, linear repair (4-1 ratio was explained), local flaps, skin grafts, cartilage grafts and interpolation flaps (the need for multiple surgeries was explained). Possible outcomes were reviewed including likely scar appearance, failure of flap survival, infection, bleeding and the need for revision surgery.     The pathology was reviewed, the photograph was reviewed, and the referring physician's note was reviewed.    Patient elected for Mohs surgery.    The patient has a squamous cell carcinoma.  The pathology was reviewed, the photograph was reviewed, and the referring physicians note was reviewed.  Multiple treatment options including mohs surgery (which has moderate risk of morbidity) were reviewed.    Medical Decision Making  Column 1- Squamous Cell Carcinoma (1 acute uncomplicated illness- Low)  Column 2- 3 tests reviewed (pathology, photograph, referring physician notes- Moderate)  Column 3- Modertate risk of morbidity from additional treatment- mohs surgry- Moderate)    Overall Moderate MDM

## 2024-07-10 NOTE — LETTER
MOH's Provider/Referral Letter Treatment Plan    Patient: Sarthak Prado   YOB: 1965   Date of Visit: 7/10/2024   MRN: [id number]     Silver Norris MD  [facility]   [address] [address]    Dear Silver Norris MD,     I had the pleasure of seeing Sarthak Prado today in consultation at your request for evaluation and treatment of:  1. Squamous cell cancer of skin of right hand  Right radial dorsal Hand    Mohs surgery    Staff Communication: Dermatology Local Anesthesia: 1 % Lidocaine / Epinephrine - Amount:1.4cc      Mohs surgery was indicated because of the nature of the lesion and the need to obtain the highest cure rate.  After informed consent was obtained, the patient underwent the procedure without complication.    The skin cancer was removed, wound care instructions were given and the patient was advised to follow up with you.  I will see the patient post-operatively as indicated.    Thank you very much for your confidence in me and for allowing me to share in the care of this patient.    1. Squamous cell cancer of skin of right hand  Right radial dorsal Hand  Is a 2.2 x 1.0 cm scar    Mohs surgery    Consent obtained: written    Universal Protocol:  Procedure explained and questions answered to patient or proxy's satisfaction: Yes    Test results available and properly labeled: Yes    Pathology report reviewed: Yes    External notes reviewed: Yes    Photo or diagram used for site identification: Yes    Site/side marked: Yes    Slide independently reviewed by Mohs surgeon: Yes    Immediately prior to procedure a time out was called: Yes    Patient identity confirmed: verbally with patient  Preparation: Patient was prepped and draped in usual sterile fashion      Anticoagulation:  Is the patient taking prescription anticoagulant and/or aspirin prescribed/recommended by a physician? No    Was the anticoagulation regimen changed prior to Mohs? No       Anesthesia:  Anesthesia method: local infiltration  Local anesthetic: lidocaine 1% WITH epi    Procedure Details:  Biopsy accession number: [id number]  Date of biopsy: 4/24/2024  Pre-Op diagnosis: squamous cell carcinoma  SCC subtype: well differentiated.  Surgery side: right  Surgical site (from skin exam): Right radial dorsal Hand  Pre-operative length (cm): 2.2  Pre-operative width (cm): 1  Indications for Mohs surgery: anatomic location where tissue conservation is critical and tumor size greater than 2 cm  Previously treated? No      Micrographic Surgery Details:  Post-operative length (cm): 2.4  Post-operative width (cm): 1.2  Number of Mohs stages: 1    Stage 1     Comments: The patient was brought into the operating room and placed in the procedure chair in the appropriate position.  The area positive by previous biopsy was identified and confirmed with the patient. The area of clinically obvious tumor was debulked using a curette and/or scalpel as needed. An incision was made following the Mohs approach through the skin. The specimen was taken to the lab, divided into 2 piece(s) and appropriately chromacoded and processed.         Tumor features identified on Mohs section: no tumor identified    Depth of defect: subcutaneous fat    Patient tolerance of procedure: tolerated well, no immediate complications    Reconstruction:  Was the defect reconstructed?: No    Fine/surface layer approximation (top stitches)   Hemostasis achieved with: Gelfoam  Outcome: patient tolerated procedure well with no complications    Post-procedure details: sterile dressing applied and wound care instructions given    Dressing type: Gelfoam and pressure dressing    Additional details:  Repair: After a discussion with the patient regarding the options for wound closure, a decision was made to proceed with second intention healing.  Dressing F/U: Surgifoam was placed in the wound. A pressure dressing was placed to help stabilize  the wound and to minimize the risk of postoperative bleeding. Wound care was discussed, and the patient was given written post-operative wound care instructions.        Staff Communication: Dermatology Local Anesthesia: 1 % Lidocaine / Epinephrine - Amount:1.4cc           Sincerely,       Matt Trinidad MD  [facility]

## 2024-07-30 ENCOUNTER — PATIENT MESSAGE (OUTPATIENT)
Dept: PRIMARY CARE | Facility: CLINIC | Age: 59
End: 2024-07-30
Payer: COMMERCIAL

## 2024-07-30 DIAGNOSIS — E78.00 PURE HYPERCHOLESTEROLEMIA: Primary | ICD-10-CM

## 2024-07-30 RX ORDER — ROSUVASTATIN CALCIUM 5 MG/1
5 TABLET, COATED ORAL DAILY
Qty: 90 TABLET | Refills: 1 | Status: SHIPPED | OUTPATIENT
Start: 2024-07-30 | End: 2025-01-26

## 2024-09-10 ENCOUNTER — OFFICE VISIT (OUTPATIENT)
Dept: PRIMARY CARE | Facility: CLINIC | Age: 59
End: 2024-09-10
Payer: COMMERCIAL

## 2024-09-10 VITALS
HEART RATE: 110 BPM | HEIGHT: 70 IN | WEIGHT: 213 LBS | BODY MASS INDEX: 30.49 KG/M2 | DIASTOLIC BLOOD PRESSURE: 90 MMHG | SYSTOLIC BLOOD PRESSURE: 148 MMHG

## 2024-09-10 DIAGNOSIS — R30.0 DYSURIA: Primary | ICD-10-CM

## 2024-09-10 DIAGNOSIS — N39.0 COMPLICATED UTI (URINARY TRACT INFECTION): ICD-10-CM

## 2024-09-10 DIAGNOSIS — R35.0 URINARY FREQUENCY: ICD-10-CM

## 2024-09-10 LAB
POC APPEARANCE, URINE: ABNORMAL
POC BILIRUBIN, URINE: NEGATIVE
POC BLOOD, URINE: NEGATIVE
POC COLOR, URINE: YELLOW
POC GLUCOSE, URINE: NEGATIVE MG/DL
POC KETONES, URINE: NEGATIVE MG/DL
POC LEUKOCYTES, URINE: NEGATIVE
POC NITRITE,URINE: NEGATIVE
POC PH, URINE: 6.5 PH
POC PROTEIN, URINE: NEGATIVE MG/DL
POC SPECIFIC GRAVITY, URINE: 1.01
POC UROBILINOGEN, URINE: 1 EU/DL

## 2024-09-10 PROCEDURE — 99213 OFFICE O/P EST LOW 20 MIN: CPT | Performed by: STUDENT IN AN ORGANIZED HEALTH CARE EDUCATION/TRAINING PROGRAM

## 2024-09-10 PROCEDURE — 81002 URINALYSIS NONAUTO W/O SCOPE: CPT | Performed by: STUDENT IN AN ORGANIZED HEALTH CARE EDUCATION/TRAINING PROGRAM

## 2024-09-10 PROCEDURE — 87086 URINE CULTURE/COLONY COUNT: CPT

## 2024-09-10 PROCEDURE — 87186 SC STD MICRODIL/AGAR DIL: CPT

## 2024-09-10 PROCEDURE — 3008F BODY MASS INDEX DOCD: CPT | Performed by: STUDENT IN AN ORGANIZED HEALTH CARE EDUCATION/TRAINING PROGRAM

## 2024-09-10 RX ORDER — CIPROFLOXACIN 250 MG/1
250 TABLET, FILM COATED ORAL 2 TIMES DAILY
Qty: 14 TABLET | Refills: 0 | Status: SHIPPED | OUTPATIENT
Start: 2024-09-10 | End: 2024-09-17

## 2024-09-10 RX ORDER — CIPROFLOXACIN 250 MG/1
250 TABLET, FILM COATED ORAL 2 TIMES DAILY
Qty: 14 TABLET | Refills: 0 | Status: SHIPPED | OUTPATIENT
Start: 2024-09-10 | End: 2024-09-10 | Stop reason: SDUPTHER

## 2024-09-10 ASSESSMENT — ENCOUNTER SYMPTOMS
FREQUENCY: 1
CHILLS: 0
DYSURIA: 1
VOMITING: 0
SWEATS: 0
HEMATURIA: 0
FLANK PAIN: 0

## 2024-09-10 ASSESSMENT — PATIENT HEALTH QUESTIONNAIRE - PHQ9
2. FEELING DOWN, DEPRESSED OR HOPELESS: NOT AT ALL
SUM OF ALL RESPONSES TO PHQ9 QUESTIONS 1 AND 2: 0
1. LITTLE INTEREST OR PLEASURE IN DOING THINGS: NOT AT ALL

## 2024-09-10 NOTE — PROGRESS NOTES
Answers submitted by the patient for this visit:  Painful Urination Questionnaire (Submitted on 9/10/2024)  Chief Complaint: Dysuria  Chronicity: new  Onset: yesterday  Frequency: every urination  Progression since onset: gradually worsening  Pain quality: aching  Pain severity: moderate  Pain - numeric: 6/10  Sexually active?: Yes  History of pyelonephritis?: No  hematuria: No  chills: No  hesitancy: No  discharge: No  frequency: Yes  flank pain: No  possible pregnancy: No  urgency: Yes  sweats: No  vomiting: No

## 2024-09-10 NOTE — PROGRESS NOTES
"Subjective   Patient ID: Sarthak Prado \"Heidy" is a 59 y.o. male who presents for UTI.  HPI  He is 59 years old man who presented today to the office for burning sensation with voiding increased frequency since yesterday.   Patient reports having feeling of burning in the genital area but reports no unprotected sex.  This is the first time for him having the symptoms.  Patient also has a catheter on due to the bladder problems.  He has been placing catheter for the past 4 to 5 years.     Denies new onset headaches, fever, chills, n/v/d, chest pain, SOB, abdominal pain, flank pain, rash or lesion in the genital area and lower extremity edema.     Review of Systems  All other systems have been reviewed and are negative.    Visit Vitals  /90   Pulse 110   Ht 1.778 m (5' 10\")   Wt 96.6 kg (213 lb)   BMI 30.56 kg/m²   Smoking Status Some Days   BSA 2.18 m²       Objective   Physical Exam  General: Alert and oriented. Appears well-nourished and in no acute distress.  Eyes: PERRLA. EOMI.  Head/neck: Normocephalic. Supple.  Lymphatics: No cervical lymphadenopathy.  Respiratory/Thorax: Clear to auscultation bilaterally. No wheezing.   Cardiovascular: Regular rate and rhythm. No murmurs.  Gastrointestinal: Soft, nontender, nondistended. +BS   Musculoskeletal: ROM intact. No joint swelling. Normal strength   Extremities: Warm and well perfused. No peripheral edema.  Neurological: No gross neurologic deficits.   Psychological: Appropriate mood and affect.   Skin: No visible rashes or lesions.     Assessment/Plan   He is 59 years old man with past medical history of seizure, overweight who presents today to the office for UTI symptoms    # Complicated UTI  -POCT UA was showing positive for nitrate and leukocyte with blood.  -Advised to increase the amount of fluid  -Advised to take ciprofloxacin 250 mg twice daily  -Send for culture.  Patient will be updated if the culture is sensitive to different " antibiotic.  -Patient was advised to go to the emergency department if his symptoms get worse.  -Patient was informed to the plan and he understood.      No red flags. Follow up next year for health maintenance and sooner as needed    Problem List Items Addressed This Visit    None  Visit Diagnoses       Dysuria    -  Primary    Relevant Orders    POCT UA (nonautomated) manually resulted (Completed)    Urine Culture    Urinary frequency        Relevant Orders    POCT UA (nonautomated) manually resulted (Completed)    Urine Culture    Complicated UTI (urinary tract infection)        Relevant Medications    ciprofloxacin (Cipro) 250 mg tablet            I have personally reviewed all available pertinent labs, imaging, and consult notes with the patient.     All questions and concerns were addressed. Patient verbalizes understanding instructions and agrees with established plan of care.     I discussed the plan with Dr.Shahrimanyan Roxi Stevens MD  Family medicine resident  PGY3

## 2024-09-12 ENCOUNTER — APPOINTMENT (OUTPATIENT)
Dept: OTOLARYNGOLOGY | Facility: CLINIC | Age: 59
End: 2024-09-12
Payer: COMMERCIAL

## 2024-09-12 VITALS — TEMPERATURE: 97.3 F | BODY MASS INDEX: 30.49 KG/M2 | WEIGHT: 213 LBS | HEIGHT: 70 IN

## 2024-09-12 DIAGNOSIS — H69.91 DYSFUNCTION OF RIGHT EUSTACHIAN TUBE: Primary | ICD-10-CM

## 2024-09-12 DIAGNOSIS — R09.81 CHRONIC NASAL CONGESTION: ICD-10-CM

## 2024-09-12 DIAGNOSIS — H61.23 BILATERAL IMPACTED CERUMEN: ICD-10-CM

## 2024-09-12 DIAGNOSIS — J30.89 NON-SEASONAL ALLERGIC RHINITIS DUE TO OTHER ALLERGIC TRIGGER: ICD-10-CM

## 2024-09-12 DIAGNOSIS — J34.2 DEVIATED NASAL SEPTUM: ICD-10-CM

## 2024-09-12 DIAGNOSIS — J32.0 CHRONIC MAXILLARY SINUSITIS: ICD-10-CM

## 2024-09-12 PROCEDURE — 99204 OFFICE O/P NEW MOD 45 MIN: CPT | Performed by: OTOLARYNGOLOGY

## 2024-09-12 PROCEDURE — 3008F BODY MASS INDEX DOCD: CPT | Performed by: OTOLARYNGOLOGY

## 2024-09-12 PROCEDURE — 69210 REMOVE IMPACTED EAR WAX UNI: CPT | Performed by: OTOLARYNGOLOGY

## 2024-09-12 RX ORDER — AZELASTINE 1 MG/ML
2 SPRAY, METERED NASAL 2 TIMES DAILY
Qty: 90 ML | Refills: 0 | Status: SHIPPED | OUTPATIENT
Start: 2024-09-12

## 2024-09-12 RX ORDER — FLUTICASONE PROPIONATE 50 MCG
2 SPRAY, SUSPENSION (ML) NASAL DAILY
Qty: 48 ML | Refills: 0 | Status: SHIPPED | OUTPATIENT
Start: 2024-09-12

## 2024-09-12 NOTE — PROGRESS NOTES
" Subjective   Patient ID: Sarthak Prado \"Heidy" is a 59 y.o. male  HPI  Patient is complaining of a chronic history of nasal congestion on the right side.  He also complains of chronic intermittent congestion in the right ear.  He has no otalgia and no otorrhea and no vertigo.  He has no purulence from his nose and no facial pain.    Review of Systems   HENT:  Positive for congestion and hearing loss.        Objective   Physical Exam  The following elements of a brief ear nose and throat exam were performed: External ear canals and tympanic membranes, external nose and nasal passages, oral cavity, palpation of the neck, percussion of the face, palpation of the thyroid.    There is cerumen impaction bilaterally and this was cleared using speculum and curettes.  There is some retraction of the right tympanic membrane noted and it is moving with a Valsalva maneuver.  There is nasal edema and the turbinates are pale and there is a deviation of the septum to the right side.  There is no purulence or facial tenderness noted.  The remainder of his exam was within normal limits.    Ear cerumen removal    Date/Time: 9/12/2024 12:00 PM    Performed by: Charles Nunes MD  Authorized by: Charles Nunes MD    Consent:     Consent obtained:  Verbal    Risks discussed:  Pain  Procedure details:     Location:  L ear and R ear    Procedure type: curette        Assessment/Plan   Diagnoses and all orders for this visit:  Dysfunction of right eustachian tube (Primary)  -     Tympanometry Only; Future  -     Comprehensive hearing test; Future  Chronic maxillary sinusitis  -     Referral to ENT  Bilateral impacted cerumen  -     Ear cerumen removal  Non-seasonal allergic rhinitis due to other allergic trigger  -     azelastine (Astelin) 137 mcg (0.1 %) nasal spray; Administer 2 sprays into each nostril 2 times a day.  -     fluticasone (Flonase) 50 mcg/actuation nasal spray; Administer 2 sprays into each nostril once daily. Shake " gently. Before first use, prime pump. After use, clean tip and replace cap.  Chronic nasal congestion  Deviated nasal septum     1.  Chronic allergic rhinitis and deviation of the septum leading to chronic nasal congestion on the right side.  The patient was started on Flonase Astelin nasal sprays.  2.  Chronic right eustachian tube dysfunction and bilateral cerumen impaction cleared today.  The patient was advised to perform the Valsalva maneuver on a daily basis and he was scheduled for an audiogram and tympanogram.

## 2024-09-13 LAB — BACTERIA UR CULT: ABNORMAL

## 2024-10-03 ENCOUNTER — OFFICE VISIT (OUTPATIENT)
Dept: PRIMARY CARE | Facility: CLINIC | Age: 59
End: 2024-10-03
Payer: COMMERCIAL

## 2024-10-03 VITALS
WEIGHT: 210 LBS | HEART RATE: 98 BPM | DIASTOLIC BLOOD PRESSURE: 82 MMHG | BODY MASS INDEX: 30.06 KG/M2 | HEIGHT: 70 IN | SYSTOLIC BLOOD PRESSURE: 122 MMHG

## 2024-10-03 DIAGNOSIS — R30.0 DYSURIA: ICD-10-CM

## 2024-10-03 DIAGNOSIS — R35.0 URINARY FREQUENCY: ICD-10-CM

## 2024-10-03 DIAGNOSIS — N30.01 ACUTE CYSTITIS WITH HEMATURIA: Primary | ICD-10-CM

## 2024-10-03 LAB
POC APPEARANCE, URINE: CLEAR
POC BILIRUBIN, URINE: NEGATIVE
POC BLOOD, URINE: ABNORMAL
POC COLOR, URINE: YELLOW
POC GLUCOSE, URINE: NEGATIVE MG/DL
POC KETONES, URINE: NEGATIVE MG/DL
POC LEUKOCYTES, URINE: ABNORMAL
POC NITRITE,URINE: POSITIVE
POC PH, URINE: 6 PH
POC PROTEIN, URINE: ABNORMAL MG/DL
POC SPECIFIC GRAVITY, URINE: 1.01
POC UROBILINOGEN, URINE: 1 EU/DL

## 2024-10-03 PROCEDURE — 81002 URINALYSIS NONAUTO W/O SCOPE: CPT | Performed by: STUDENT IN AN ORGANIZED HEALTH CARE EDUCATION/TRAINING PROGRAM

## 2024-10-03 PROCEDURE — 87086 URINE CULTURE/COLONY COUNT: CPT

## 2024-10-03 PROCEDURE — 3008F BODY MASS INDEX DOCD: CPT | Performed by: STUDENT IN AN ORGANIZED HEALTH CARE EDUCATION/TRAINING PROGRAM

## 2024-10-03 PROCEDURE — 87186 SC STD MICRODIL/AGAR DIL: CPT

## 2024-10-03 PROCEDURE — 99214 OFFICE O/P EST MOD 30 MIN: CPT | Performed by: STUDENT IN AN ORGANIZED HEALTH CARE EDUCATION/TRAINING PROGRAM

## 2024-10-03 RX ORDER — CIPROFLOXACIN 500 MG/1
500 TABLET ORAL DAILY
Qty: 7 TABLET | Refills: 0 | Status: SHIPPED | OUTPATIENT
Start: 2024-10-03 | End: 2024-10-10

## 2024-10-03 ASSESSMENT — ENCOUNTER SYMPTOMS
VOMITING: 0
NAUSEA: 0
FLANK PAIN: 0
HEMATURIA: 0
SWEATS: 1
DYSURIA: 1
CHILLS: 0
FREQUENCY: 0

## 2024-10-03 NOTE — PROGRESS NOTES
"Sarthak Prado is a 59 y.o. year old male presenting for UTI       HPI:    Urinary Tract Infection  Patient states that about 6 weeks ago he was diagnosed with a UTI that was treated with ciprofloxacin. His symptoms resolved within 2-3 days of starting the antibiotic. Today, he reports feeling similar symptoms of dysuria and increased frequency that's started 3 days ago. He denies any burning sensation, fevers/chills, discharge, or back pain.     ROS:   All pertinent positive symptoms are included in history of present illness.    All other systems have been reviewed and are negative and noncontributory to this patient's current ailments.    Current Outpatient Medications   Medication Sig Dispense Refill    azelastine (Astelin) 137 mcg (0.1 %) nasal spray Administer 2 sprays into each nostril 2 times a day. 90 mL 0    ciprofloxacin (Cipro) 500 mg tablet Take 1 tablet (500 mg) by mouth once daily for 7 days. 7 tablet 0    fluticasone (Flonase) 50 mcg/actuation nasal spray Administer 2 sprays into each nostril once daily. Shake gently. Before first use, prime pump. After use, clean tip and replace cap. 48 mL 0    lamoTRIgine (LaMICtal) 200 mg tablet Take 1 tablet (200 mg) by mouth 2 times a day. 180 tablet 3    rosuvastatin (Crestor) 5 mg tablet Take 1 tablet (5 mg) by mouth once daily. 90 tablet 1     No current facility-administered medications for this visit.       OBJECTIVE  Visit Vitals  /82   Pulse 98   Ht 1.778 m (5' 10\")   Wt 95.3 kg (210 lb)   BMI 30.13 kg/m²   Smoking Status Some Days   BSA 2.17 m²        Physical Exam:  GENERAL: Alert, oriented, pleasant, in no acute distress  HEENT: Head normocephalic, atraumatic  CV: Heart with regular rate and rhythm, normal S1/S2, no murmurs; normal peripheral pulses- radial and dorsalis pedis palpable  LUNGS: CTAB without wheezing, rhonchi or rales; good respiratory effort, no increased work of breathing  ABDOMEN: soft, non-tender, non-distended, no masses " appreciated; +BS in all four quadrants  EXTREMITIES: no edema, no cyanosis  PSYCH: Appropriate mood and affect  SKIN: No rashes or lesions appreciated    Assessment/Plan     Urinary Tract Infection  POCT UA in office demonstrated blood, leukocytes, nitrates and protein, and we will have the urine cultured. We sent a prescription of ciprofloxacin 500 mg once daily for 7 days. If you have any new or worsening symptoms, please don't hesitate to reach out. Discussed seeing a urologist if symptoms persist or if the UTI recurs.       Problem List Items Addressed This Visit    None  Visit Diagnoses         Codes    Acute cystitis with hematuria    -  Primary N30.01    Relevant Medications    ciprofloxacin (Cipro) 500 mg tablet    Other Relevant Orders    Urine Culture    Dysuria     R30.0    Relevant Orders    POCT UA (nonautomated) manually resulted (Completed)    Urinary frequency     R35.0    Relevant Orders    POCT UA (nonautomated) manually resulted (Completed)

## 2024-10-05 LAB — BACTERIA UR CULT: ABNORMAL

## 2024-10-10 ENCOUNTER — APPOINTMENT (OUTPATIENT)
Dept: AUDIOLOGY | Facility: CLINIC | Age: 59
End: 2024-10-10
Payer: COMMERCIAL

## 2024-10-10 ENCOUNTER — APPOINTMENT (OUTPATIENT)
Dept: OTOLARYNGOLOGY | Facility: CLINIC | Age: 59
End: 2024-10-10
Payer: COMMERCIAL

## 2024-10-10 VITALS — BODY MASS INDEX: 30.06 KG/M2 | WEIGHT: 210 LBS | HEIGHT: 70 IN

## 2024-10-10 DIAGNOSIS — J34.2 DEVIATED NASAL SEPTUM: ICD-10-CM

## 2024-10-10 DIAGNOSIS — H69.91 DYSFUNCTION OF RIGHT EUSTACHIAN TUBE: ICD-10-CM

## 2024-10-10 DIAGNOSIS — R09.81 CHRONIC NASAL CONGESTION: ICD-10-CM

## 2024-10-10 DIAGNOSIS — H93.8X1 EAR PRESSURE, RIGHT: ICD-10-CM

## 2024-10-10 DIAGNOSIS — J34.3 HYPERTROPHY OF NASAL TURBINATES: ICD-10-CM

## 2024-10-10 DIAGNOSIS — J30.89 NON-SEASONAL ALLERGIC RHINITIS, UNSPECIFIED TRIGGER: Primary | ICD-10-CM

## 2024-10-10 DIAGNOSIS — H90.3 SENSORINEURAL HEARING LOSS (SNHL) OF BOTH EARS: Primary | ICD-10-CM

## 2024-10-10 PROCEDURE — 99214 OFFICE O/P EST MOD 30 MIN: CPT | Performed by: OTOLARYNGOLOGY

## 2024-10-10 PROCEDURE — 31231 NASAL ENDOSCOPY DX: CPT | Performed by: OTOLARYNGOLOGY

## 2024-10-10 PROCEDURE — 3008F BODY MASS INDEX DOCD: CPT | Performed by: OTOLARYNGOLOGY

## 2024-10-10 PROCEDURE — 92567 TYMPANOMETRY: CPT

## 2024-10-10 PROCEDURE — 92557 COMPREHENSIVE HEARING TEST: CPT

## 2024-10-10 RX ORDER — FLUTICASONE PROPIONATE 50 MCG
2 SPRAY, SUSPENSION (ML) NASAL DAILY
Qty: 48 ML | Refills: 1 | Status: SHIPPED | OUTPATIENT
Start: 2024-10-10

## 2024-10-10 RX ORDER — AZELASTINE 1 MG/ML
2 SPRAY, METERED NASAL 2 TIMES DAILY
Qty: 90 ML | Refills: 1 | Status: SHIPPED | OUTPATIENT
Start: 2024-10-10

## 2024-10-10 NOTE — PROGRESS NOTES
"Subjective   Patient ID: Sarthak Prado \"Heidy" is a 59 y.o. male  HPI  Patient presents for follow-up for allergic rhinitis and right eustachian tube dysfunction and chronic nasal congestion.  He is feeling better after starting the Flonase and Astelin nasal sprays and the allergy symptoms have subsided.  The right ear congestion has also improved.  He has no purulence from his nose and no facial pain.  He continues to complain of some nasal congestion on the left side.    Review of Systems    Objective   Physical Exam  There is a deviation of the septum to the left side and the inferior turbinates are enlarged.  The posterior nasal passage could not be visualized.  The right tympanic membrane slightly retracted and it is moving with a Valsalva maneuver.  Nasal endoscopy was offered in light of the chronic nasal congestion.  There was a severe deviation of the septum to the left side anteriorly and inferior turbinates are enlarged and there is a bone spur on the right side.  There was no polyps or purulence in the nasopharynx and ostiomeatal complexes are noted to be clear.  The hearing test reveals bilateral mild to moderate high-frequency sensorineural hearing loss.  The tympanograms are normal.    Nasal / sinus endoscopy    Date/Time: 10/10/2024 11:19 AM    Performed by: Charles Nunes MD  Authorized by: Charles Nunes MD    Consent:     Consent obtained:  Verbal  Procedure details:     Meds administered: lidocaine and afrin.    Instrument: flexible fiberoptic nasal endoscope    Comments:      After informed consent was discussed and obtained, and after topical anesthesia was applied, the flexible endoscope was inserted atraumatically in to each nasal cavity.  Three passes were made with the scope superiorly along the middle turbinate and middle meatus and along the floor of the nose.  Please see office note for other findings.  The patient tolerated all portions of the procedure well.      Assessment/Plan "   Diagnoses and all orders for this visit:  Non-seasonal allergic rhinitis, unspecified trigger (Primary)  -     fluticasone (Flonase) 50 mcg/actuation nasal spray; Administer 2 sprays into each nostril once daily. Shake gently. Before first use, prime pump. After use, clean tip and replace cap.  -     azelastine (Astelin) 137 mcg (0.1 %) nasal spray; Administer 2 sprays into each nostril 2 times a day.  Deviated nasal septum  -     Case Request Operating Room: Repair Septum Nasal Cavity, Excision Nasal Turbinate; Standing  -     Case Request Operating Room: Repair Septum Nasal Cavity, Excision Nasal Turbinate  Dysfunction of right eustachian tube  Chronic nasal congestion  -     Nasal / sinus endoscopy  Hypertrophy of nasal turbinates  -     Case Request Operating Room: Repair Septum Nasal Cavity, Excision Nasal Turbinate; Standing  -     Case Request Operating Room: Repair Septum Nasal Cavity, Excision Nasal Turbinate  Other orders  -     NPO Diet Except: Sips with meds; Effective now; Standing  -     Height and weight; Standing  -     Insert and maintain peripheral IV; Standing  -     Saline lock IV; Standing  -     Type And Screen; Standing  -     Inpatient consult to Respiratory Care; Standing  -     Place in outpatient/hospital ambulatory surgery; Standing  -     Full code; Standing  -     Vital Signs; Standing  -     Pulse oximetry, spot; Standing     1.  Chronic allergic rhinitis improved with Flonase and Astelin nasal sprays.  2.  Chronic right eustachian tube dysfunction improved with the Valsalva maneuver.  3.  Chronic bilateral mild to moderate high-frequency sensorineural hearing loss.  The patient was medically cleared for use of hearing aids.  4.  Chronic deviation of the septum and hypertrophy of the inferior turbinates leading to nasal congestion despite management of the allergies.  The patient was offered septoplasty and bilateral submucous resection of the inferior turbinates.  The risk and  benefits and alternatives were explained including the option of no surgery.

## 2024-10-10 NOTE — PROGRESS NOTES
"AUDIOLOGIC EVALUATION    Name:  Sarthak Prado \"Heidy"  :    1965  Age:     59 y.o.    HISTORY:     Patient reported right ear feeling clogged, uses nasal spray which does not resolve the issue. Reported some hearing loss on the right side, the left side seems unremarkable.  Reported this issue has been going on for a year and a half.    Denied otalgia, dizziness, otorrhea, recent ear infections, and tinnitus.    EVALUATION:    Please see Audiogram.    RESULTS:    Otoscopy:  Right: Clear canal, normal color and appearance of tympanic membrane.  Left: Clear canal, normal color and appearance of tympanic membrane.    Tympanometry:  Right: Normal tympanic membrane mobility and middle ear pressure.  Left: Normal tympanic membrane mobility and middle ear pressure.    Hearing Sensitivity:  Right: Essentially mild to moderate sensorineural hearing loss.  Left: Essentially within normal limits sloping to moderate sensorineural hearing loss.    Word Recognition Score (NU-6 by difficulty):  Right: Excellent (100%) at 65 dBHL.  Left: Excellent (100%) at 65 dBHL.    IMPRESSIONS:    No previous hearing evaluation on file.    ASSESSMENT AND PLAN:    - Continue medical follow-up with Dr. Nunes.  - Consider hearing needs assessment to discuss management of hearing loss.   - Monitor and recheck hearing as warranted.  - Counseled regarding results and recommendations.      Abdifatah Ambrocio.  Clinical Audiologist.  "

## 2024-10-15 NOTE — PROGRESS NOTES
"HEARING NEEDS ASSESSMENT    Name: Sarthak Prado \"Heidy"  :   1965  Age:    59 y.o.    HISTORY:    Previous hearing test on 10/10/2024 revealed Right: Essentially mild to moderate sensorineural hearing loss. Left: Essentially within normal limits sloping to moderate sensorineural hearing loss. Medical clearance was provided by Dr. Nunes on 10/10/2024.          EXAM/PROCEDURES:    Reviewed patient's hearing loss. Reviewed patient's insurance coverage. Patient does not have insurance benefits towards hearing aid technology.      Demonstrated Oticon Intent 1 miniRITE and Phonak Audeo U87-Xyupro hearing aids. Discussed patient's communication needs and concerns. Patient reported speech clarity concerns, epecially in the presence of background noise. Discussed patient's expectations and goals for use of hearing aids.      Patient inquired about the necessity of having two hearing aids. He was counseled regarding the benefits of binaural hearing (binaural summation, localization, support in background noise, etc.).    Discussed styles of hearing aids, levels of technology, rechargeable considerations, and Bluetooth technologies. Reviewed pricing and TLC program.     Patient selected:  Devices: Phonak Audeo N83-Ksgvus (two devices)  color: Silver grey  domes: Large open  : 2(M).    RECOMMENDATIONS AND FOLLOW-UP:    - Return in approximately 2 weeks for hearing aid fitting. Appointment is scheduled for 2024  - Patient is to contact the office sooner if concerns arise.   - Monitor and recheck hearing as warranted.  - Counseled patient regarding recommendations.      Abdifatah Ambrocio.  Clinical Audiologist.  "

## 2024-10-17 ENCOUNTER — APPOINTMENT (OUTPATIENT)
Dept: AUDIOLOGY | Facility: CLINIC | Age: 59
End: 2024-10-17
Payer: COMMERCIAL

## 2024-10-17 DIAGNOSIS — H90.3 SENSORINEURAL HEARING LOSS (SNHL) OF BOTH EARS: Primary | ICD-10-CM

## 2024-10-28 DIAGNOSIS — R56.9 SEIZURE (MULTI): ICD-10-CM

## 2024-10-28 RX ORDER — LAMOTRIGINE 200 MG/1
TABLET ORAL 2 TIMES DAILY
Qty: 180 TABLET | Refills: 3 | Status: SHIPPED | OUTPATIENT
Start: 2024-10-28

## 2024-10-31 ENCOUNTER — OFFICE VISIT (OUTPATIENT)
Dept: PRIMARY CARE | Facility: CLINIC | Age: 59
End: 2024-10-31
Payer: COMMERCIAL

## 2024-10-31 VITALS
WEIGHT: 207 LBS | SYSTOLIC BLOOD PRESSURE: 142 MMHG | HEART RATE: 104 BPM | DIASTOLIC BLOOD PRESSURE: 84 MMHG | BODY MASS INDEX: 29.63 KG/M2 | HEIGHT: 70 IN

## 2024-10-31 DIAGNOSIS — R39.15 URGENCY OF URINATION: ICD-10-CM

## 2024-10-31 DIAGNOSIS — N30.01 ACUTE CYSTITIS WITH HEMATURIA: Primary | ICD-10-CM

## 2024-10-31 DIAGNOSIS — R30.0 DYSURIA: ICD-10-CM

## 2024-10-31 LAB
POC APPEARANCE, URINE: ABNORMAL
POC BILIRUBIN, URINE: NEGATIVE
POC BLOOD, URINE: ABNORMAL
POC COLOR, URINE: YELLOW
POC GLUCOSE, URINE: NEGATIVE MG/DL
POC KETONES, URINE: NEGATIVE MG/DL
POC LEUKOCYTES, URINE: ABNORMAL
POC NITRITE,URINE: NEGATIVE
POC PH, URINE: 6.5 PH
POC PROTEIN, URINE: NEGATIVE MG/DL
POC SPECIFIC GRAVITY, URINE: 1.01
POC UROBILINOGEN, URINE: 1 EU/DL

## 2024-10-31 PROCEDURE — 87186 SC STD MICRODIL/AGAR DIL: CPT

## 2024-10-31 PROCEDURE — 3008F BODY MASS INDEX DOCD: CPT | Performed by: STUDENT IN AN ORGANIZED HEALTH CARE EDUCATION/TRAINING PROGRAM

## 2024-10-31 PROCEDURE — 99214 OFFICE O/P EST MOD 30 MIN: CPT | Performed by: STUDENT IN AN ORGANIZED HEALTH CARE EDUCATION/TRAINING PROGRAM

## 2024-10-31 PROCEDURE — 81002 URINALYSIS NONAUTO W/O SCOPE: CPT | Performed by: STUDENT IN AN ORGANIZED HEALTH CARE EDUCATION/TRAINING PROGRAM

## 2024-10-31 PROCEDURE — 87086 URINE CULTURE/COLONY COUNT: CPT

## 2024-10-31 RX ORDER — CIPROFLOXACIN 500 MG/1
500 TABLET ORAL 2 TIMES DAILY
Qty: 10 TABLET | Refills: 0 | Status: SHIPPED | OUTPATIENT
Start: 2024-10-31 | End: 2024-11-05

## 2024-11-03 LAB — BACTERIA UR CULT: ABNORMAL

## 2024-11-09 NOTE — PROGRESS NOTES
"HEARING AID FITTING    Name:  Sarthak Prado \"Heidy"  :    1965  Age:     59 y.o.    HEARING AID INFORMATION:    Right Hearing Aid  Phonak Audeo Z05-Fzgfup  SN: [id number]  Warranty: 10/12/2027  Left Hearing Aid  Phonak Audeo B20-Whryby  SN: [id number]  Warranty: 10/12/2027   Phonak ChargerGo  SN: [id number]   Length   Right: 2(M)  Left: 2(M)   Dome/Earmold  Right: Large open  Left: Large open  Wax Filter  CeruStop    Fitting date:  2024  TLC Expiration:  2028    EXAM/PROCEDURES:    Hearing aids programmed to most recent user audiogram. Programmed to 90% target gain. with adaptation enabled to 100% over 15 days. Completed feedback measurement. Reviewed alerting tones. Volume control active.     Practiced insertion, removal, charging, and VC use. Patient demonstrated adequate manipulation of the devices.     Review maintenance and care, wax filter and dome replacement, pairing devices to patient's smartphone will be discussed at follow-up appointment.     Completed hearing aid fitting checklist, TLC agreement, and purchase agreement.     RECOMMENDATIONS AND FOLLOW-UP:    - Return for 2-week hearing aid follow-up.  - Continue use of amplification and follow-up as recommended for hearing aid maintenance and adjustments.      Abdifatah Ambrocio.  Clinical Audiologist.  "

## 2024-11-12 ENCOUNTER — APPOINTMENT (OUTPATIENT)
Dept: AUDIOLOGY | Facility: CLINIC | Age: 59
End: 2024-11-12
Payer: COMMERCIAL

## 2024-11-12 DIAGNOSIS — H90.3 SENSORINEURAL HEARING LOSS (SNHL) OF BOTH EARS: Primary | ICD-10-CM

## 2024-12-05 ENCOUNTER — APPOINTMENT (OUTPATIENT)
Dept: AUDIOLOGY | Facility: CLINIC | Age: 59
End: 2024-12-05
Payer: COMMERCIAL

## 2024-12-05 DIAGNOSIS — H90.3 SENSORINEURAL HEARING LOSS (SNHL) OF BOTH EARS: Primary | ICD-10-CM

## 2024-12-05 NOTE — PROGRESS NOTES
"HEARING AID CHECK    Name:  Sarthak Prado \"Heidy"  :    1965  Age:    59 y.o.    HEARING AID INFORMATION:    Right Hearing Aid  Phonak Audeo W25-Kxfhva  SN: [id number]  Warranty: 10/12/2027  Left Hearing Aid  Phonak Audeo Z48-Zeclvy  SN: [id number]  Warranty: 10/12/2027   Phonak ChargerGo  SN: [id number]   Length   Right: 2(M)  Left: 2(M)   Dome/Earmold  Right: Large open  Left: Large open  Wax Filter  CeruStop     Fitting date:  2024  TLC Expiration:  2028      HISTORY:    Patient was seen for his first follow up after his fitting. He reported continuous difficulty with the volume control buttons.     EXAM/PROCEDURES:    Otoscopy revealed minimal non-occluding cerumen with normal appearing tympanic membranes, bilaterally.     Visual inspection revealed devices in good condition.      Cleaned and checked hearing aids.  Brushed vivi ports, replaced wax filters and domes. Reviewed with patient clean and maintenance.     Connected to [employer] software, confirmed no firmware update was needed. Devices are at 97% of adaptation level while automatic adaptation is enabeled to reach 100% over the next 5 days. Datalogging revealed an average of 6 hours of hearing aid use per day.     Paired patient's devices to his phone through bluetooth and to [employer]. Successful phone call was completed today.     No charge for today's visit. Treatment in progress (TIP).     RECOMMENDATIONS AND FOLLOW-UP:    - Continue use of amplification and follow-up as recommended for hearing aid maintenance and adjustments.  - Continue with scheduled 2-week follow-up HAC. Patient to contact the office sooner if problems arise.  - Monitor and recheck hearing as warranted.  - Counseled regarding results and recommendations.      Abdifatah Ambrocio.  Clinical Audiologist.  "

## 2024-12-17 NOTE — PROGRESS NOTES
"HEARING AID CHECK    Name:  Sarthak Prado \"Heidy"  :    1965  Age:    59 y.o.    HEARING AID INFORMATION:    Right Hearing Aid  Phonak Audeo G02-Ctdugz  SN: [id number]  Warranty: 10/12/2027  Left Hearing Aid  Phonak Audeo N50-Tyqmes  SN: [id number]  Warranty: 10/12/2027   Phonak ChargerGo  SN: [id number]   Length   Right: 2(M)  Left: 2(M)   Dome/Earmold  Right: Large open  Left: Large open  Wax Filter  CeruStop     Fitting date:  2024  TLC Expiration:  2028      HISTORY:    Patient was seen for hearing aid follow up post hearing aid fitting, he reported no concerns with his devices.  He requested to have Tap Control enabled as well to review answering phone call while phone is paired to his devices.    EXAM/PROCEDURES:    Otoscopy revealed minimal non-occluding cerumen with normal appearing tympanic membranes, bilaterally.     Visual inspection revealed devices in good condition.      Connected to [employer] software, confirmed no firmware update was needed, datalogging revealed an average of 10.4 hours of use time per day. Completed on ear verification using perscriptive formula NAL-NL2 . Devices at 100% of adaptation level:    Right Hearing Aid:  Targets were met for 250 - 4000 Hz.   Aided Soft Speech (55 dB SPL) SII: 78 %  Aided Moderate Speech  (65 dB SPL) SII: 93 % compared to Unaided Moderate Speech SII of 83 %  Aided Loud Speech  (75 dB SPL) SII: 93 %    Left Hearing Aid:   Targets were met for 250 - 4000 Hz.   Aided Soft Speech  (55 dB SPL) SII: 73 %  Aided Moderate Speech  (65 dB SPL) SII: 89 % compared to Unaided Moderate Speech SII of 80 %  Aided Loud Speech  (75 dB SPL) SII: 90 %    Patient reported saticfaction with the sound quality after completing hearing aid verification.  But requested to decrease the volume slightly to allow for brain adjustment, decreased adaptation level to 85% post verification.    Enabled and practiced Tap control.  Successful " phone call completed; patient demonstrated competency with phone call streaming.    No charge for today's visit. Treatment in progress (TIP).     RECOMMENDATIONS AND FOLLOW-UP:    - Continue use of amplification and follow-up as recommended for hearing aid maintenance and adjustments.  - Recommended 2-month follow-up HAC. Patient to contact the office sooner if problems arise.  - Monitor and recheck hearing as warranted.  - Counseled regarding results and recommendations.      Abdifatah Ambrocio.  Clinical Audiologist.

## 2024-12-19 ENCOUNTER — APPOINTMENT (OUTPATIENT)
Dept: AUDIOLOGY | Facility: CLINIC | Age: 59
End: 2024-12-19
Payer: COMMERCIAL

## 2024-12-19 DIAGNOSIS — H90.3 SENSORINEURAL HEARING LOSS (SNHL) OF BOTH EARS: Primary | ICD-10-CM

## 2025-01-03 ENCOUNTER — TELEPHONE (OUTPATIENT)
Dept: SLEEP MEDICINE | Facility: CLINIC | Age: 60
End: 2025-01-03
Payer: COMMERCIAL

## 2025-01-07 DIAGNOSIS — E78.00 PURE HYPERCHOLESTEROLEMIA: Primary | ICD-10-CM

## 2025-01-07 RX ORDER — ROSUVASTATIN CALCIUM 5 MG/1
5 TABLET, COATED ORAL DAILY
Qty: 90 TABLET | Refills: 0 | Status: SHIPPED | OUTPATIENT
Start: 2025-01-07 | End: 2025-07-06

## 2025-01-09 NOTE — TELEPHONE ENCOUNTER
PAP order placed. Pls send to DME of patient preference as he will be switching DME for PAP supplies

## 2025-01-20 PROBLEM — E78.5 HLD (HYPERLIPIDEMIA): Status: ACTIVE | Noted: 2025-01-20

## 2025-01-21 ENCOUNTER — APPOINTMENT (OUTPATIENT)
Dept: PRIMARY CARE | Facility: CLINIC | Age: 60
End: 2025-01-21
Payer: COMMERCIAL

## 2025-01-21 ENCOUNTER — LAB (OUTPATIENT)
Dept: LAB | Facility: LAB | Age: 60
End: 2025-01-21
Payer: COMMERCIAL

## 2025-01-21 VITALS
BODY MASS INDEX: 31.5 KG/M2 | HEIGHT: 70 IN | HEART RATE: 67 BPM | DIASTOLIC BLOOD PRESSURE: 80 MMHG | WEIGHT: 220 LBS | SYSTOLIC BLOOD PRESSURE: 124 MMHG

## 2025-01-21 DIAGNOSIS — R73.03 PREDIABETES: ICD-10-CM

## 2025-01-21 DIAGNOSIS — E78.2 MIXED HYPERLIPIDEMIA: Primary | ICD-10-CM

## 2025-01-21 DIAGNOSIS — R56.9 SEIZURE (MULTI): ICD-10-CM

## 2025-01-21 DIAGNOSIS — J18.1 LUNG CONSOLIDATION (CMS-HCC): ICD-10-CM

## 2025-01-21 DIAGNOSIS — E78.2 MIXED HYPERLIPIDEMIA: ICD-10-CM

## 2025-01-21 LAB
ALBUMIN SERPL BCP-MCNC: 4.8 G/DL (ref 3.4–5)
ALP SERPL-CCNC: 71 U/L (ref 33–120)
ALT SERPL W P-5'-P-CCNC: 35 U/L (ref 10–52)
ANION GAP SERPL CALC-SCNC: 10 MMOL/L (ref 10–20)
AST SERPL W P-5'-P-CCNC: 25 U/L (ref 9–39)
BILIRUB SERPL-MCNC: 0.7 MG/DL (ref 0–1.2)
BUN SERPL-MCNC: 14 MG/DL (ref 6–23)
CALCIUM SERPL-MCNC: 10.8 MG/DL (ref 8.6–10.3)
CHLORIDE SERPL-SCNC: 103 MMOL/L (ref 98–107)
CHOLEST SERPL-MCNC: 167 MG/DL (ref 0–199)
CHOLESTEROL/HDL RATIO: 2.7
CO2 SERPL-SCNC: 29 MMOL/L (ref 21–32)
CREAT SERPL-MCNC: 1.02 MG/DL (ref 0.5–1.3)
EGFRCR SERPLBLD CKD-EPI 2021: 85 ML/MIN/1.73M*2
GLUCOSE SERPL-MCNC: 78 MG/DL (ref 74–99)
HDLC SERPL-MCNC: 61.2 MG/DL
LDLC SERPL CALC-MCNC: 90 MG/DL
NON HDL CHOLESTEROL: 106 MG/DL (ref 0–149)
POTASSIUM SERPL-SCNC: 4.1 MMOL/L (ref 3.5–5.3)
PROT SERPL-MCNC: 7.6 G/DL (ref 6.4–8.2)
SODIUM SERPL-SCNC: 138 MMOL/L (ref 136–145)
TRIGL SERPL-MCNC: 79 MG/DL (ref 0–149)
VLDL: 16 MG/DL (ref 0–40)

## 2025-01-21 PROCEDURE — 80053 COMPREHEN METABOLIC PANEL: CPT

## 2025-01-21 PROCEDURE — 4004F PT TOBACCO SCREEN RCVD TLK: CPT | Performed by: FAMILY MEDICINE

## 2025-01-21 PROCEDURE — 83036 HEMOGLOBIN GLYCOSYLATED A1C: CPT

## 2025-01-21 PROCEDURE — 80061 LIPID PANEL: CPT

## 2025-01-21 PROCEDURE — 3008F BODY MASS INDEX DOCD: CPT | Performed by: FAMILY MEDICINE

## 2025-01-21 PROCEDURE — 99214 OFFICE O/P EST MOD 30 MIN: CPT | Performed by: FAMILY MEDICINE

## 2025-01-21 ASSESSMENT — PATIENT HEALTH QUESTIONNAIRE - PHQ9
SUM OF ALL RESPONSES TO PHQ9 QUESTIONS 1 AND 2: 0
1. LITTLE INTEREST OR PLEASURE IN DOING THINGS: NOT AT ALL
2. FEELING DOWN, DEPRESSED OR HOPELESS: NOT AT ALL

## 2025-01-21 NOTE — ASSESSMENT & PLAN NOTE
Noted on CTA June 2024, no follow-up done yet  Please obtain that CXR the Dr. Robins suggested obtaining to ensure there has been resolution

## 2025-01-21 NOTE — ASSESSMENT & PLAN NOTE
Fasting blood work ordered  CT cardiac score recommended, please have this done at your earliest convenience  We will notify of test results once available and send medication, if warranted, to [facility] at the appropriate dose

## 2025-01-21 NOTE — ASSESSMENT & PLAN NOTE
Hemoglobin A1c was last recorded at 5.8%  Please obtain blood work so that we can ensure that you have not become diabetic  We will notify of test results once available

## 2025-01-21 NOTE — PROGRESS NOTES
"Subjective   Patient ID: Sarthak Prado \"Pawan\" is a 59 y.o. male who presents for Hyperlipidemia.    Past Medical, Surgical, and Family History reviewed and updated in chart.    Reviewed all medications by prescribing practitioner or clinical pharmacist (such as prescriptions, OTCs, herbal therapies and supplements) and documented in the medical record.    HPI  1. Hyperlipidemia    - Pawan is currently on Rosuvastatin 5 mg daily.    - Most recent lipid panel in July 2024 showed a cholesterol level of 202 and LDL of 130.    - CT cardiac score has yet to be done; he is willing to proceed with it.    - ASCVD risk is calculated at 12%.    2. 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    - Diagnosed with a brain lesion; initial evaluation at [facility] recommended surgical removal, but Pawan opted against surgery.    - Sought a second opinion at , where he was placed on medication instead of undergoing surgery.    - Has been on Lamictal for the past 12+ years with excellent results.    - Currently under the care of neurology at  [facility].    3. Prediabetes    - Hemoglobin A1c in July 2024 was 5.8%, indicating prediabetes but not diabetes.    - BMI is 31+.    4. Lung Consolidation    - Visited the ED on Gabi 15, 2024, for atypical chest pain; CTA revealed lung consolidation.    - Follow-up with PCP was recommended, but no follow-up has occurred regarding his lungs.    - Dr. Robins suggested a repeat chest X-ray during a follow-up in July 2024, which Pawan is willing to do but has not yet completed, telling me that he forgot to do it.    5. Health Maintenance    - Claims to have received shingles vaccination at a local pharmacy, such as [facility], though no records are available.    - Reports having undergone colon cancer screening at [facility] around 2020.    Review of Systems  All pertinent positive symptoms are " included in the history of present illness.    All other systems have been reviewed and are negative and noncontributory to this patient's current ailments.    Past Medical History:   Diagnosis Date    Seizure (Multi)      Past Surgical History:   Procedure Laterality Date    MOUTH SURGERY  09/02/2015    Oral Surgery Tooth Extraction     Social History     Tobacco Use    Smoking status: Some Days     Types: Cigarettes    Smokeless tobacco: Never   Substance Use Topics    Alcohol use: Yes     Family History   Problem Relation Name Age of Onset    Hearing loss Other       Immunization History   Administered Date(s) Administered    COVID-19, mRNA, LNP-S, PF, 30 mcg/0.3 mL dose 03/14/2021, 04/04/2021, 11/28/2021    Flu vaccine (IIV4), preservative free *Check age/dose* 10/10/2014, 09/08/2017, 10/02/2018, 10/13/2019, 10/19/2020, 10/24/2022    Flu vaccine, quadrivalent, no egg protein, age 6 month or greater (FLUCELVAX) 11/09/2023    Flu vaccine, trivalent, preservative free, no egg protein, age 18y+ (Flublok) 10/16/2024    Hep A / Hep B 08/08/2007, 09/14/2007, 02/01/2008    Influenza, Unspecified 12/02/2005, 12/02/2006, 12/08/2007, 11/08/2008, 10/05/2011, 11/03/2012, 10/01/2013    Influenza, injectable, quadrivalent 11/10/2015, 11/15/2016, 09/14/2021    Novel influenza-H1N1-09, preservative-free 12/21/2009    Pfizer COVID-19 vaccine, 12 years and older, (30mcg/0.3mL) (Comirnaty) 11/09/2023, 10/16/2024    Pneumococcal polysaccharide vaccine, 23-valent, age 2 years and older (PNEUMOVAX 23) 03/23/2022    Tdap vaccine, age 7 year and older (BOOSTRIX, ADACEL) 09/14/2007, 09/08/2017, 03/23/2022    Typhoid, Unspecified 09/14/2007    Yellow Fever 09/14/2007     Current Outpatient Medications   Medication Instructions    azelastine (Astelin) 137 mcg (0.1 %) nasal spray 2 sprays, Each Nostril, 2 times daily    fluticasone (Flonase) 50 mcg/actuation nasal spray 2 sprays, Each Nostril, Daily, Shake gently. Before first use, prime  "pump. After use, clean tip and replace cap.    lamoTRIgine (LaMICtal) 200 mg tablet oral, 2 times daily    rosuvastatin (CRESTOR) 5 mg, oral, Daily     Allergies   Allergen Reactions    Penicillins Rash    Tetanus Vaccines And Toxoid Rash       Objective   Vitals:    01/21/25 1324   BP: 124/80   Pulse: 67   Weight: 99.8 kg (220 lb)   Height: 1.778 m (5' 10\")     Body mass index is 31.57 kg/m².    BP Readings from Last 3 Encounters:   01/21/25 124/80   10/31/24 142/84   10/03/24 122/82      Wt Readings from Last 3 Encounters:   01/21/25 99.8 kg (220 lb)   10/31/24 93.9 kg (207 lb)   10/10/24 95.3 kg (210 lb)        No visits with results within 1 Month(s) from this visit.   Latest known visit with results is:   Office Visit on 10/31/2024   Component Date Value    POC Color, Urine 10/31/2024 Yellow     POC Appearance, Urine 10/31/2024 Cloudy (A)     POC Glucose, Urine 10/31/2024 NEGATIVE     POC Bilirubin, Urine 10/31/2024 NEGATIVE     POC Ketones, Urine 10/31/2024 NEGATIVE     POC Specific Gravity, Ur* 10/31/2024 1.015     POC Blood, Urine 10/31/2024 LARGE (3+) (A)     POC PH, Urine 10/31/2024 6.5     POC Protein, Urine 10/31/2024 NEGATIVE     POC Urobilinogen, Urine 10/31/2024 1.0     Poc Nitrite, Urine 10/31/2024 NEGATIVE     POC Leukocytes, Urine 10/31/2024 TRACE (A)     Urine Culture 10/31/2024 20,000 - 80,000 Klebsiella pneumoniae/variicola (A)      Physical Exam  CONSTITUTIONAL - well nourished, well developed, looks like stated age, in no acute distress, not ill-appearing, and not tired appearing  SKIN - normal skin color and pigmentation, normal skin turgor without rash, lesions, or nodules visualized  HEAD - no trauma, normocephalic  EYES - pupils are equal and reactive to light, extraocular muscles are intact, and normal external exam  CHEST - clear to auscultation, no wheezing, no crackles and no rales, good effort  CARDIAC - regular rate and regular rhythm, no skipped beats, no murmur  EXTREMITIES - no " obvious or evident edema, no obvious or evident deformities  NEUROLOGICAL - normal gait, normal balance, normal motor, no ataxia, alert, oriented and no focal signs  PSYCHIATRIC - alert, pleasant and cordial, age-appropriate    Assessment/Plan   Problem List Items Addressed This Visit       Seizure (Multi)     Stable, continue lamotrigine 200 mg BID.  Continue to follow with neurology per their protocol         HLD (hyperlipidemia) - Primary     Fasting blood work ordered  CT cardiac score recommended, please have this done at your earliest convenience  We will notify of test results once available and send medication, if warranted, to [facility] at the appropriate dose         Relevant Orders    Comprehensive Metabolic Panel    Lipid Panel    CT cardiac scoring wo IV contrast    Prediabetes     Hemoglobin A1c was last recorded at 5.8%  Please obtain blood work so that we can ensure that you have not become diabetic  We will notify of test results once available         Relevant Orders    Hemoglobin A1C    Lung consolidation (CMS-HCC)     Noted on CTA June 2024, no follow-up done yet  Please obtain that CXR the Dr. Robins suggested obtaining to ensure there has been resolution

## 2025-01-22 LAB
EST. AVERAGE GLUCOSE BLD GHB EST-MCNC: 111 MG/DL
HBA1C MFR BLD: 5.5 %

## 2025-01-23 NOTE — RESULT ENCOUNTER NOTE
Sugar, electrolytes, kidney, liver normal  Hemoglobin A1c is excellent at 5.5  Cholesterol wonderful at 167 with LDL 90 so much improved since previous.  Continue rosuvastatin 5 mg daily without change, although if CT cardiac score is 0 then we can discontinue it  Calcium is up to 10.8, it has been normal in the past so that is something that we should continue to monitor going forward

## 2025-02-12 ENCOUNTER — HOSPITAL ENCOUNTER (OUTPATIENT)
Dept: RADIOLOGY | Facility: CLINIC | Age: 60
Discharge: HOME | End: 2025-02-12
Payer: COMMERCIAL

## 2025-02-12 DIAGNOSIS — E78.2 MIXED HYPERLIPIDEMIA: ICD-10-CM

## 2025-02-12 DIAGNOSIS — J18.9 PNEUMONIA OF BOTH LOWER LOBES DUE TO INFECTIOUS ORGANISM: ICD-10-CM

## 2025-02-12 PROCEDURE — 71046 X-RAY EXAM CHEST 2 VIEWS: CPT

## 2025-02-12 PROCEDURE — 75571 CT HRT W/O DYE W/CA TEST: CPT

## 2025-02-14 NOTE — RESULT ENCOUNTER NOTE
Your CT cardiac score is 0, indicating that there is no identifiable coronary artery plaque in your four main coronary arteries. This is excellent news, as it suggests a low risk of coronary artery disease at this time.    You are currently taking rosuvastatin 5 mg daily. While this is a viable medication for managing cholesterol levels, based on your CT cardiac score, it may not be necessary to continue unless your LDL cholesterol level increases. Your last LDL level, checked seven months ago, was 130, which is within an acceptable range.    It would be advisable to monitor your LDL levels regularly to ensure they remain below 180. If your LDL levels increase, we can reassess the need for continuing rosuvastatin or consider other management options.

## 2025-02-20 ENCOUNTER — APPOINTMENT (OUTPATIENT)
Dept: AUDIOLOGY | Facility: CLINIC | Age: 60
End: 2025-02-20
Payer: COMMERCIAL

## 2025-02-20 DIAGNOSIS — H90.3 SENSORINEURAL HEARING LOSS (SNHL) OF BOTH EARS: Primary | ICD-10-CM

## 2025-02-20 NOTE — PROGRESS NOTES
"HEARING AID CHECK    Name:  Sarthak Prado \"Heidy"  :    1965  Age:    59 y.o.    HEARING AID INFORMATION:    Right Hearing Aid  Phonak Audeo [id number]  SN: [id number]  Warranty: 10/12/2027  Left Hearing Aid  Phonak Audeo [id number]  SN: [id number]  Warranty: 10/12/2027   Phonak ChargerGo  SN: [id number]   Length   Right: 2(M)  Left: 2(M)   Dome/Earmold  Right: Large open  Left: Large open  Wax Filter  CeruStop     Fitting date:  2024  TLC Expiration:  2028      HISTORY:    Patient requested to turn down soft environmental sounds today.    EXAM/PROCEDURES:    Otoscopy revealed minimal non-occluding cerumen with normal appearing tympanic membranes, bilaterally.    Visual inspection and initial listening check revealed devices in good working conditions. Cleaned and checked hearing aids. Replaced wax filters and domes.     Connected to [employer] software; confirmed no firmware update was needed. Datalogging revealed an average of 6.8 hours of hearing aid use per day. Devices are at 85% of adaptation level which was increased to 90%. Reduced gain for soft and very soft sounds at all frequencies. Patient reported improvement in sound quality following programming.    Patient was counseled on the necessity to increase devices wear time to allow for brain adaptation.     No charge for today's visit. Treatment in progress (TIP).     RECOMMENDATIONS AND FOLLOW-UP:    - Continue use of amplification and follow-up as recommended for hearing aid maintenance and adjustments.  - Recommended 6-month follow-up HAC. Patient to contact the office sooner if problems arise.  - Monitor and recheck hearing as warranted.  - Counseled regarding results and recommendations.      Abdifatah Ambrocio.  Clinical Audiologist.  "

## 2025-02-28 DIAGNOSIS — R56.9 SEIZURE (MULTI): ICD-10-CM

## 2025-02-28 RX ORDER — LAMOTRIGINE 200 MG/1
200 TABLET ORAL 2 TIMES DAILY
Qty: 180 TABLET | Refills: 3 | Status: SHIPPED | OUTPATIENT
Start: 2025-02-28 | End: 2026-02-28

## 2025-03-10 DIAGNOSIS — E78.00 PURE HYPERCHOLESTEROLEMIA: ICD-10-CM

## 2025-03-10 RX ORDER — ROSUVASTATIN CALCIUM 5 MG/1
5 TABLET, COATED ORAL DAILY
Qty: 90 TABLET | Refills: 3 | OUTPATIENT
Start: 2025-03-10

## 2025-03-31 ENCOUNTER — APPOINTMENT (OUTPATIENT)
Dept: NEUROLOGY | Facility: HOSPITAL | Age: 60
End: 2025-03-31
Payer: COMMERCIAL

## 2025-04-08 ENCOUNTER — HOSPITAL ENCOUNTER (OUTPATIENT)
Dept: RADIOLOGY | Facility: HOSPITAL | Age: 60
Discharge: HOME | End: 2025-04-08
Payer: COMMERCIAL

## 2025-04-08 ENCOUNTER — OFFICE VISIT (OUTPATIENT)
Dept: ORTHOPEDIC SURGERY | Facility: HOSPITAL | Age: 60
End: 2025-04-08
Payer: COMMERCIAL

## 2025-04-08 DIAGNOSIS — M25.531 RIGHT WRIST PAIN: ICD-10-CM

## 2025-04-08 DIAGNOSIS — E78.00 PURE HYPERCHOLESTEROLEMIA: ICD-10-CM

## 2025-04-08 DIAGNOSIS — M65.4 RADIAL STYLOID TENOSYNOVITIS OF RIGHT HAND: ICD-10-CM

## 2025-04-08 DIAGNOSIS — S63.501A RIGHT WRIST SPRAIN, INITIAL ENCOUNTER: Primary | ICD-10-CM

## 2025-04-08 PROCEDURE — L3809 WHFO W/O JOINTS PRE OTS: HCPCS | Performed by: SPECIALIST/TECHNOLOGIST

## 2025-04-08 PROCEDURE — 73110 X-RAY EXAM OF WRIST: CPT | Mod: RIGHT SIDE | Performed by: RADIOLOGY

## 2025-04-08 PROCEDURE — 99214 OFFICE O/P EST MOD 30 MIN: CPT | Performed by: SPECIALIST/TECHNOLOGIST

## 2025-04-08 PROCEDURE — 73110 X-RAY EXAM OF WRIST: CPT | Mod: RT

## 2025-04-08 PROCEDURE — 4004F PT TOBACCO SCREEN RCVD TLK: CPT | Performed by: SPECIALIST/TECHNOLOGIST

## 2025-04-08 PROCEDURE — 99204 OFFICE O/P NEW MOD 45 MIN: CPT | Performed by: SPECIALIST/TECHNOLOGIST

## 2025-04-08 RX ORDER — NAPROXEN 500 MG/1
500 TABLET ORAL 2 TIMES DAILY
Qty: 28 TABLET | Refills: 0 | Status: SHIPPED | OUTPATIENT
Start: 2025-04-08 | End: 2025-04-22

## 2025-04-08 RX ORDER — ROSUVASTATIN CALCIUM 5 MG/1
5 TABLET, COATED ORAL DAILY
Qty: 90 TABLET | Refills: 0 | Status: SHIPPED | OUTPATIENT
Start: 2025-04-08 | End: 2025-07-07

## 2025-04-08 NOTE — PROGRESS NOTES
"Chief Complaint: Pain of the Right Wrist       HPI:  Sarthak Prado \"Heidy" is a 59 y.o. right-hand-dominant male presenting to the orthopedic walk-in clinic with right wrist pain occurring approximately 10 days ago where he missed a curb and fell backwards on an outstretched hand.  He does not recall hearing or feeling a pop.  Today he reports a sharp sensation at the distal aspect of his wrist and thumb.  Pain is worsened with wrist extension and thumb extension.  He has been icing it since time of injury.  Following the injury, he went golfing and this irritated it also.  Denies prior injuries to the wrist or hand.  Denies numbness or tingling into the hand or fingers.  Presents for treatment recommendations.    Objective     ROS:  Constitutional: No fever, no chills, not feeling tired, no recent weight gain and no recent weight loss  ENT: No nosebleeds  Cardiovascular: No chest pain  Respiratory: No shortness of breath and no cough  Gastrointestinal: No abdominal pain, no nausea, no diarrhea, and no vomiting  Musculoskeletal: Positive for right wrist pain  Integumentary: No rashes and no skin lesions  Neurological: No headache  Psychiatric: No sleep disturbances no depression  Endocrine: No muscle weakness and no muscle cramps  Hematologic/lymphatic: No swelling glands and no tendency for easy bruising    Past Medical History:   Diagnosis Date    Seizure (Multi)         Past Surgical History:   Procedure Laterality Date    MOUTH SURGERY  09/02/2015    Oral Surgery Tooth Extraction        Social Connections: Not on file          Physical Exam:  General appearance: WN, WD male, in no acute distress  Skin: No rashes, lesions or wounds  Head: Normocephalic, no evidence of trauma  Eye: EOMI, conjunctiva clear, no discharge  ENT: Nares patent  Neck: No abnormal contour, tracheal midline  Chest/lungs: No respiratory distress, speaking in complete sentences  Musculoskeletal: Tender to palpation at the radial styloid, " distal radius.  Nontender anatomical snuffbox.  Painful Finkelstein's.  Stable valgus and varus stress test.  4/5 manual muscle testing wrist extension right versus left secondary to pain.  5/5 wrist flexion bilaterally.  5/5  strength bilaterally.  2+ radial pulses bilaterally.  No decreased ROM, muscle wasting, rigidity    Neurological: A&O x3, no focal deficits, intact bilateral UE  Psych: normal affect, mood, appearance      Image Results:  X-rays taken on/8/25 reviewed with the patient in the office today and show no acute fractures or dislocations.  There is severe osteoarthritic changes of the radiocarpal joint.      Assessment/Plan   Encounter Diagnoses:  Right wrist pain    Radial styloid tenosynovitis of right hand    Orders Placed This Encounter    Thumb Spica, Thumbster    XR wrist right 3+ views       The patient and I discussed his clinical presentation and physical exam findings consistent with a right wrist sprain versus de Quervain's tenosynovitis.  We agreed upon initiating conservative treatment.  We agreed upon a thumb spica brace to be worn at all times.  He may take this off to shower.  He may take this off to wash his hands.  I encouraged him, if he can tolerate, to sleep and this at night.  We agreed upon naproxen 500 mg taken twice daily with food for the next 14 days.  Additionally he will take two 500 mg extra strength Tylenol 3 times a day as needed for pain.  I encouraged him to ice his wrist and thumb for 10 to 15 minutes 2-3 times per day.  He may do activities to tolerance in the brace.  We agreed upon a referral to occupational therapy.  He will follow-up with Dr. Booker on 5/6/2025 if his symptoms persist or worsen.  He is in agreement the plan.  His questions are answered.    Patient was prescribed a thumb spica brace for right wrist sprain and de Quervain's tenosynovitis. The patient has weakness, instability and/or deformity of their right wrist and thumb which requires  stabilization from this orthosis to improve their function.      Verbal and written instructions for the use, wear schedule, cleaning and application of this item were given.  Patient was instructed that should the brace result in increased pain, decreased sensation, increased swelling, or an overall worsening of their medical condition, to please contact our office immediately.     Orthotic management and training was provided for skin care, modifications due to healing tissues, edema changes, interruption in skin integrity, and safety precautions with the orthosis.      ** This office note was dictated using Dragon voice to text software and was not proofread for spelling or grammatical errors **

## 2025-05-06 ENCOUNTER — OFFICE VISIT (OUTPATIENT)
Dept: ORTHOPEDIC SURGERY | Facility: HOSPITAL | Age: 60
End: 2025-05-06
Payer: COMMERCIAL

## 2025-05-06 DIAGNOSIS — M19.031: ICD-10-CM

## 2025-05-06 DIAGNOSIS — S63.501A RIGHT WRIST SPRAIN, INITIAL ENCOUNTER: Primary | ICD-10-CM

## 2025-05-06 PROCEDURE — 20605 DRAIN/INJ JOINT/BURSA W/O US: CPT | Mod: RT | Performed by: STUDENT IN AN ORGANIZED HEALTH CARE EDUCATION/TRAINING PROGRAM

## 2025-05-06 PROCEDURE — 99202 OFFICE O/P NEW SF 15 MIN: CPT | Mod: 25 | Performed by: STUDENT IN AN ORGANIZED HEALTH CARE EDUCATION/TRAINING PROGRAM

## 2025-05-06 PROCEDURE — 2500000004 HC RX 250 GENERAL PHARMACY W/ HCPCS (ALT 636 FOR OP/ED): Performed by: STUDENT IN AN ORGANIZED HEALTH CARE EDUCATION/TRAINING PROGRAM

## 2025-05-06 RX ORDER — BETAMETHASONE SODIUM PHOSPHATE AND BETAMETHASONE ACETATE 3; 3 MG/ML; MG/ML
1 INJECTION, SUSPENSION INTRA-ARTICULAR; INTRALESIONAL; INTRAMUSCULAR; SOFT TISSUE
Status: COMPLETED | OUTPATIENT
Start: 2025-05-06 | End: 2025-05-06

## 2025-05-06 RX ORDER — LIDOCAINE HYDROCHLORIDE 10 MG/ML
0.5 INJECTION, SOLUTION INFILTRATION; PERINEURAL
Status: COMPLETED | OUTPATIENT
Start: 2025-05-06 | End: 2025-05-06

## 2025-05-06 RX ADMIN — LIDOCAINE HYDROCHLORIDE 0.5 ML: 10 INJECTION, SOLUTION INFILTRATION; PERINEURAL at 10:06

## 2025-05-06 RX ADMIN — BETAMETHASONE SODIUM PHOSPHATE AND BETAMETHASONE ACETATE 1 MG: 3; 3 INJECTION, SUSPENSION INTRA-ARTICULAR; INTRALESIONAL; INTRAMUSCULAR at 10:06

## 2025-05-06 ASSESSMENT — PAIN - FUNCTIONAL ASSESSMENT: PAIN_FUNCTIONAL_ASSESSMENT: 0-10

## 2025-05-06 ASSESSMENT — PAIN DESCRIPTION - DESCRIPTORS: DESCRIPTORS: ACHING;SORE

## 2025-05-06 ASSESSMENT — PAIN SCALES - GENERAL: PAINLEVEL_OUTOF10: 8

## 2025-05-06 NOTE — PROGRESS NOTES
History of Present Illness   Patient presents today for evaluation of side: right upper extremity pain.    The patient has noted right wrist pain for several years.  This has waxed and waned over the years.  He notes that he recently fell and sprained his wrist last month when he fell onto an outstretched hand and exacerbated his pain.  He went to injury clinic and has been wearing a Velcro movable brace intermittently.  He does note several remote injuries in the past.  He notes several sprains to his wrist while playing football.  He had x-rays performed at injury clinic which demonstrated SL diastases and arthritis consistent with a SLAC II wrist.  He also has a remote ulnar styloid fracture.  The patient denies any loss of consciousness or additional significant injuries.  The patient denies any current numbness or tingling.      The patient notes a chronic dull pain with moments of intermittent sharp pain which radiates down the radial side of his wrist.  He is right-hand dominant and retired.     Medical History[1]    Medication Documentation Review Audit       Reviewed by Karin Ma LPN (Licensed Nurse) on 05/06/25 at 0924      Medication Order Taking? Sig Documenting Provider Last Dose Status   azelastine (Astelin) 137 mcg (0.1 %) nasal spray [id number]  Administer 2 sprays into each nostril 2 times a day. Charles Nunes MD  Active   fluticasone (Flonase) 50 mcg/actuation nasal spray [id number]  Administer 2 sprays into each nostril once daily. Shake gently. Before first use, prime pump. After use, clean tip and replace cap. Charles Nunes MD  Active   lamoTRIgine (LaMICtal) 200 mg tablet [id number]  Take 1 tablet (200 mg) by mouth 2 times a day. Ani Encinas, APRN-CNP  Active   rosuvastatin (Crestor) 5 mg tablet [id number]  Take 1 tablet (5 mg) by mouth once daily. Julienne Villafuerte DO  Active                    RX Allergies[2]    Social History     Socioeconomic History    Marital status:       Spouse name: Not on file    Number of children: Not on file    Years of education: Not on file    Highest education level: Not on file   Occupational History    Not on file   Tobacco Use    Smoking status: Some Days     Types: Cigarettes    Smokeless tobacco: Never   Substance and Sexual Activity    Alcohol use: Yes    Drug use: Not on file    Sexual activity: Not on file   Other Topics Concern    Not on file   Social History Narrative    Not on file     Social Drivers of Health     Financial Resource Strain: Not on file   Food Insecurity: Not on file   Transportation Needs: Not on file   Physical Activity: Not on file   Stress: Not on file   Social Connections: Not on file   Intimate Partner Violence: Not on file   Housing Stability: Not on file       Surgical History[3]       Review of Systems   GENERAL: Negative  GI: Negative  MUSCULOSKELETAL: See HPI  SKIN: Negative  NEURO:  Negative     Physical Exam:  side: right upper extremity:  Skin healthy to gross inspection, no breakdown  Mild swelling to the dorsum of the right wrist.  No ecchymosis tender.  Tender to palpation primarily over the radiocarpal joint at the radial scaphoid articulation.  Mild pain over the first dorsal compartment.  Negative Finklestein's test.  Pain with radial deviation and axial loading.  Mild tenderness over the SL interval.  Intact flexion and extension of 1st IP joint and finger abduction  Sensation intact to light touch medial / ulnar and radial nerve distribution   Good cap refill     Imaging  XR of the right wrist was reviewed in office today.  X-rays are dated 4/8/2025  PA, lateral and oblique demonstrates SL diastases and arthritic changes consistent with an SLAC 2 wrist.  There is bone-on-bone arthritis at the radial scaphoid facet.  Lunate capitate joint space maintained with no significant arthritic changes.     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  We discussed this is a predictable form of arthritis after an injury known as a SLAC wrist.  We discussed that his pain is related to this.  We discussed treatment options which include bracing, oral anti-inflammatory such as ibuprofen Aleve, diclofenac gel, and corticosteroid injections.  He already has a Velcro movable brace which he may use with activity that bother him.  He may use anti-inflammatories as needed.  He was given information on diclofenac gel which he will trial.  He also elected for a corticosteroid injection.    We discussed that if he fails nonoperative treatment there are several operations that could be discussed.  We briefly discussed these operations but not in detail.  This includes partial wrist denervation, proximal row carpectomy versus midcarpal fusions.    He elected to proceed with a corticosteroid injection which he tolerated well.     Follow-up as needed      M Inj/Asp: R radiocarpal on 5/6/2025 10:06 AM  Indications: pain  Details: 25 G needle, dorsal approach  Medications: 1 mg betamethasone acet,sod phos 6 mg/mL; 0.5 mL lidocaine 10 mg/mL (1 %)  Outcome: tolerated well, no immediate complications  Procedure, treatment alternatives, risks and benefits explained, specific risks discussed. Consent was given by the patient. Immediately prior to procedure a time out was called to verify the correct patient, procedure, equipment, support staff and site/side marked as required. Patient was prepped and draped in the usual sterile fashion.                      [1]   Past Medical History:  Diagnosis Date    Seizure (Multi)    [2]   Allergies  Allergen Reactions    Penicillins Rash    Tetanus Vaccines And Toxoid Rash   [3]   Past Surgical History:  Procedure Laterality Date    MOUTH SURGERY  09/02/2015    Oral Surgery Tooth Extraction

## 2025-05-10 DIAGNOSIS — J30.89 NON-SEASONAL ALLERGIC RHINITIS, UNSPECIFIED TRIGGER: ICD-10-CM

## 2025-05-10 RX ORDER — AZELASTINE 1 MG/ML
2 SPRAY, METERED NASAL 2 TIMES DAILY
Qty: 90 ML | Refills: 1 | Status: SHIPPED | OUTPATIENT
Start: 2025-05-10

## 2025-05-20 ENCOUNTER — OFFICE VISIT (OUTPATIENT)
Dept: NEUROLOGY | Facility: HOSPITAL | Age: 60
End: 2025-05-20
Payer: COMMERCIAL

## 2025-05-20 VITALS
WEIGHT: 217 LBS | HEART RATE: 93 BPM | DIASTOLIC BLOOD PRESSURE: 93 MMHG | SYSTOLIC BLOOD PRESSURE: 140 MMHG | BODY MASS INDEX: 31.14 KG/M2

## 2025-05-20 DIAGNOSIS — G40.109 FOCAL EPILEPSY (MULTI): Primary | ICD-10-CM

## 2025-05-20 DIAGNOSIS — D18.00 CAVERNOMA: ICD-10-CM

## 2025-05-20 DIAGNOSIS — Q28.3 OTHER MALFORMATIONS OF CEREBRAL VESSELS: ICD-10-CM

## 2025-05-20 DIAGNOSIS — R56.9 SEIZURE (MULTI): ICD-10-CM

## 2025-05-20 PROCEDURE — 99214 OFFICE O/P EST MOD 30 MIN: CPT | Performed by: STUDENT IN AN ORGANIZED HEALTH CARE EDUCATION/TRAINING PROGRAM

## 2025-05-21 PROBLEM — G40.109 FOCAL EPILEPSY (MULTI): Status: ACTIVE | Noted: 2025-05-21

## 2025-05-21 PROBLEM — D18.00 CAVERNOMA: Status: ACTIVE | Noted: 2025-05-21

## 2025-05-21 RX ORDER — LAMOTRIGINE 200 MG/1
200 TABLET ORAL 2 TIMES DAILY
Qty: 180 TABLET | Refills: 3 | Status: SHIPPED | OUTPATIENT
Start: 2025-05-21 | End: 2026-05-21

## 2025-05-21 NOTE — PROGRESS NOTES
Subjective     Sarthak Prado is a 60 y.o. year old right-handed male who presents for follow up of epilepsy.    4-D CLASSIFICATION:  Type: Aphasic*->Generalized tonic sz**  Freq: *1 every few months, **2 episodes overall  EZ: Left lateral temporal  Etiology: Left temporal cavernoma  RMC: None  Past AEDs: Keppra (irritability, somnolence), briviact (difficulty focusing)  Current AEDs: LMT 200mg BID    HPI  INTERVAL HX:  He was last seen by SONIDO Encinas in 11/2023 and he was still having few aphasic episodes without loss of consciousness. Since then he has retired. He feels well and his stress level is significantly better and he thinks his episodes are much improved as well.   He is enjoying life and is staying active.     Current Medications[1]  Allergies[2]    Review of Systems  As per HPI, otherwise all other systems have been reviewed are negative for complaint.      Objective   BP (!) 140/93   Pulse 93   Wt 98.4 kg (217 lb)   BMI 31.14 kg/m²     Neurological Exam  Physical Exam  Awake alert oriented  Normal language function for comprehension and fluency  Normal attention and concentration  Gaze midline  Face symmetric  No dysarthria  Moves both arms equally well        Assessment/Plan   Sarthak Prado is a 60 y.o. year old right-handed male who presents for follow up of epilepsy.    4-D CLASSIFICATION:  Type: Aphasic*->Generalized tonic sz**  Freq: *1 every few months, **2 episodes overall  EZ: Left lateral temporal  Etiology: Left temporal cavernoma  RMC: None  Past AEDs: Keppra (irritability, somnolence), briviact (difficulty focusing)  Current AEDs: LMT 200mg BID    Plan:  - He is doing really well. His aphasic episodes are quite sporadic. He hasn't had any seizures with loss of consciousness in a long time.   - Continue LMT 200mg BID.  - F/U once a year.    Farhan Quezada MD  [facility]    The total appointment time today was 30 minutes. Time included preparing to see the patient, obtaining  the history, performing a medically necessary appropriate physical examination, counseling and educating the patient/family/caregiver, ordering medications, tests and procedures, referring and communicating with other providers, independently interpreting results and communicating the results to the patient/family/caregiver, care coordination] and documenting clinical information in the medical record.                   [1]   Current Outpatient Medications:     azelastine (Astelin) 137 mcg (0.1 %) nasal spray, ADMINISTER 2 SPRAYS INTO EACH NOSTRIL 2 TIMES A DAY., Disp: 90 mL, Rfl: 1    fluticasone (Flonase) 50 mcg/actuation nasal spray, Administer 2 sprays into each nostril once daily. Shake gently. Before first use, prime pump. After use, clean tip and replace cap., Disp: 48 mL, Rfl: 1    lamoTRIgine (LaMICtal) 200 mg tablet, Take 1 tablet (200 mg) by mouth 2 times a day., Disp: 180 tablet, Rfl: 3    rosuvastatin (Crestor) 5 mg tablet, Take 1 tablet (5 mg) by mouth once daily., Disp: 90 tablet, Rfl: 0  [2]   Allergies  Allergen Reactions    Penicillins Rash    Tetanus Vaccines And Toxoid Rash      Statement Selected

## 2025-06-09 ENCOUNTER — PATIENT MESSAGE (OUTPATIENT)
Dept: PRIMARY CARE | Facility: CLINIC | Age: 60
End: 2025-06-09
Payer: COMMERCIAL

## 2025-06-09 DIAGNOSIS — E78.00 PURE HYPERCHOLESTEROLEMIA: ICD-10-CM

## 2025-06-09 RX ORDER — ROSUVASTATIN CALCIUM 5 MG/1
5 TABLET, COATED ORAL DAILY
Qty: 90 TABLET | Refills: 3 | OUTPATIENT
Start: 2025-06-09

## 2025-06-12 DIAGNOSIS — E78.00 PURE HYPERCHOLESTEROLEMIA: ICD-10-CM

## 2025-06-12 RX ORDER — ROSUVASTATIN CALCIUM 5 MG/1
5 TABLET, COATED ORAL DAILY
Qty: 30 TABLET | Refills: 0 | Status: SHIPPED | OUTPATIENT
Start: 2025-06-12 | End: 2025-07-12

## 2025-06-30 ENCOUNTER — TELEPHONE (OUTPATIENT)
Dept: SLEEP MEDICINE | Facility: CLINIC | Age: 60
End: 2025-06-30
Payer: COMMERCIAL

## 2025-06-30 DIAGNOSIS — G47.33 OSA (OBSTRUCTIVE SLEEP APNEA): ICD-10-CM

## 2025-07-01 ENCOUNTER — APPOINTMENT (OUTPATIENT)
Dept: SLEEP MEDICINE | Facility: CLINIC | Age: 60
End: 2025-07-01
Payer: COMMERCIAL

## 2025-07-05 ASSESSMENT — PROMIS GLOBAL HEALTH SCALE
RATE_MENTAL_HEALTH: EXCELLENT
RATE_GENERAL_HEALTH: VERY GOOD
CARRYOUT_SOCIAL_ACTIVITIES: EXCELLENT
CARRYOUT_PHYSICAL_ACTIVITIES: COMPLETELY
RATE_AVERAGE_PAIN: 0
RATE_AVERAGE_FATIGUE: MILD
RATE_PHYSICAL_HEALTH: GOOD
EMOTIONAL_PROBLEMS: NEVER
RATE_SOCIAL_SATISFACTION: EXCELLENT
RATE_QUALITY_OF_LIFE: EXCELLENT

## 2025-07-08 ENCOUNTER — APPOINTMENT (OUTPATIENT)
Dept: PRIMARY CARE | Facility: CLINIC | Age: 60
End: 2025-07-08
Payer: COMMERCIAL

## 2025-07-08 VITALS
SYSTOLIC BLOOD PRESSURE: 132 MMHG | WEIGHT: 213.6 LBS | HEIGHT: 70 IN | BODY MASS INDEX: 30.58 KG/M2 | OXYGEN SATURATION: 95 % | DIASTOLIC BLOOD PRESSURE: 80 MMHG | HEART RATE: 81 BPM

## 2025-07-08 DIAGNOSIS — Z13.9 SCREENING FOR CONDITION: ICD-10-CM

## 2025-07-08 DIAGNOSIS — Z13.1 SCREENING FOR DIABETES MELLITUS: ICD-10-CM

## 2025-07-08 DIAGNOSIS — Z13.6 SCREENING FOR CARDIOVASCULAR CONDITION: ICD-10-CM

## 2025-07-08 DIAGNOSIS — Z12.5 SCREENING FOR PROSTATE CANCER: ICD-10-CM

## 2025-07-08 DIAGNOSIS — Z13.29 SCREENING FOR ENDOCRINE DISORDER: ICD-10-CM

## 2025-07-08 DIAGNOSIS — Z12.11 SCREENING FOR MALIGNANT NEOPLASM OF COLON: ICD-10-CM

## 2025-07-08 DIAGNOSIS — Z00.00 ENCOUNTER FOR PREVENTIVE HEALTH EXAMINATION: Primary | ICD-10-CM

## 2025-07-08 DIAGNOSIS — E78.00 PURE HYPERCHOLESTEROLEMIA: ICD-10-CM

## 2025-07-08 PROCEDURE — 3008F BODY MASS INDEX DOCD: CPT | Performed by: STUDENT IN AN ORGANIZED HEALTH CARE EDUCATION/TRAINING PROGRAM

## 2025-07-08 PROCEDURE — 99396 PREV VISIT EST AGE 40-64: CPT | Performed by: STUDENT IN AN ORGANIZED HEALTH CARE EDUCATION/TRAINING PROGRAM

## 2025-07-08 PROCEDURE — 4004F PT TOBACCO SCREEN RCVD TLK: CPT | Performed by: STUDENT IN AN ORGANIZED HEALTH CARE EDUCATION/TRAINING PROGRAM

## 2025-07-08 RX ORDER — ROSUVASTATIN CALCIUM 5 MG/1
5 TABLET, COATED ORAL DAILY
Qty: 90 TABLET | Refills: 3 | Status: SHIPPED | OUTPATIENT
Start: 2025-07-08 | End: 2026-07-08

## 2025-07-08 NOTE — PROGRESS NOTES
"Subjective   Patient ID: Sarthak Prado \"Heidy" is a 60 y.o. male who presents for Annual Exam.    HPI  Diet is well balanced.  Exercises regularly.  Due for colon cancer screening. Las colonoscopy 10 years ago  Vaccines are up to date.  Dental exam is up to date.  Vision exam is up to date.  Patient is fasting for labs today.  Social: Tobacco use- cigars while golfing      Alcohol use- 4 drinks per week    Other concerns addressed today include:   Needs refill of his rosuvastatin 5mg daily. No issues or concerns.    Review of Systems  All pertinent positive symptoms are included in the history of present illness.    All other systems have been reviewed and are negative and noncontributory to this patient's current ailments.     Social History     Tobacco Use    Smoking status: Some Days     Types: Cigarettes    Smokeless tobacco: Never   Substance Use Topics    Alcohol use: Yes     Alcohol/week: 4.0 standard drinks of alcohol     Types: 4 Shots of liquor per week      Surgical History[1]   Allergies[2]     Current Medications[3]     Problem List[4]   Immunization History   Administered Date(s) Administered    COVID-19, mRNA, LNP-S, PF, 30 mcg/0.3 mL dose 03/14/2021, 04/04/2021, 11/28/2021    Flu vaccine (IIV4), preservative free *Check age/dose* 10/10/2014, 09/08/2017, 10/02/2018, 10/13/2019, 10/19/2020, 10/24/2022    Flu vaccine, quadrivalent, no egg protein, age 6 month or greater (FLUCELVAX) 11/09/2023    Flu vaccine, trivalent, preservative free, no egg protein, age 18y+ (Flublok) 10/16/2024    Hep A / Hep B 08/08/2007, 09/14/2007, 02/01/2008    Influenza, Unspecified 12/02/2005, 12/02/2006, 12/08/2007, 11/08/2008, 10/05/2011, 11/03/2012, 10/01/2013    Influenza, injectable, quadrivalent 11/10/2015, 11/15/2016, 09/14/2021    Novel influenza-H1N1-09, preservative-free 12/21/2009    Pfizer COVID-19 vaccine, 12 years and older, (30mcg/0.3mL) (Comirnaty) 11/09/2023, 10/16/2024    Pneumococcal polysaccharide " "vaccine, 23-valent, age 2 years and older (PNEUMOVAX 23) 03/23/2022    Tdap vaccine, age 7 year and older (BOOSTRIX, ADACEL) 09/14/2007, 09/08/2017, 03/23/2022    Typhoid, Unspecified 09/14/2007    Yellow Fever 09/14/2007       Objective   VITALS  /80   Pulse 81   Ht 1.778 m (5' 10\")   Wt 96.9 kg (213 lb 9.6 oz)   SpO2 95%   BMI 30.65 kg/m²      Physical Exam  CONSTITUTIONAL - well nourished, well developed, looks like stated age, in no acute distress, not ill-appearing  SKIN - normal skin color and pigmentation, normal skin turgor without rash, lesions, or nodules visualized  HEAD - no trauma, normocephalic  EYES - pupils are equal and reactive to light, extraocular muscles are intact, and normal external exam  ENT - TM's intact, no injection, no signs of infection, uvula midline, normal tongue movement and throat normal, no exudate, nasal passage without discharge and patent  NECK - supple without rigidity, no neck mass was observed, no thyromegaly or thyroid nodules  CHEST - clear to auscultation, no wheezing, no crackles and no rales, good effort  CARDIAC - regular rate and regular rhythm, no skipped beats, no murmur  ABDOMEN - no organomegaly, soft, nontender, nondistended, normal bowel sounds, no guarding/rebound/rigidity  EXTREMITIES - no edema, no deformities  NEUROLOGICAL - normal gait, normal balance, normal motor, no ataxia, DTRs equal and symmetrical; alert, oriented and no focal signs  PSYCHIATRIC - alert, pleasant and cordial, age-appropriate  IMMUNOLOGIC - no cervical lymphadenopathy       Assessment/Plan   Diagnoses and all orders for this visit:  Encounter for preventive health examination  A complete history and physical was performed today.  Vaccines are up to date.  Colon cancer screening ordered  Fasting labs ordered today include:  -     CBC; Future  -     Comprehensive Metabolic Panel; Future  -     Hemoglobin A1C; Future  -     Lipid Panel; Future  -     TSH with reflex to Free T4 " if abnormal; Future  Screening for condition  -     CBC; Future  -     Comprehensive Metabolic Panel; Future  Screening for diabetes mellitus  -     Hemoglobin A1C; Future  Screening for cardiovascular condition  -     Lipid Panel; Future  Screening for endocrine disorder  -     TSH with reflex to Free T4 if abnormal; Future  Screening for prostate cancer  -     Prostate Specific Antigen; Future  Screening for malignant neoplasm of colon  -     Colonoscopy Screening; Average Risk Patient; Future  Pure hypercholesterolemia  CT cardiac score of 0 but The 10-year ASCVD risk score (Gaetano REED, et al., 2019) is: 10.6%    Values used to calculate the score:      Age: 60 years      Sex: Male      Is Non- : No      Diabetic: No      Tobacco smoker: Yes      Systolic Blood Pressure: 132 mmHg      Is BP treated: No      HDL Cholesterol: 61.2 mg/dL      Total Cholesterol: 167 mg/dL  Doing well, refills provided  -     rosuvastatin (Crestor) 5 mg tablet; Take 1 tablet (5 mg) by mouth once daily.    Return in 1 year and sooner as needed         [1]   Past Surgical History:  Procedure Laterality Date    MOUTH SURGERY  09/02/2015    Oral Surgery Tooth Extraction   [2]   Allergies  Allergen Reactions    Penicillins Rash    Tetanus Vaccines And Toxoid Rash   [3]   Current Outpatient Medications   Medication Sig Dispense Refill    azelastine (Astelin) 137 mcg (0.1 %) nasal spray ADMINISTER 2 SPRAYS INTO EACH NOSTRIL 2 TIMES A DAY. 90 mL 1    fluticasone (Flonase) 50 mcg/actuation nasal spray Administer 2 sprays into each nostril once daily. Shake gently. Before first use, prime pump. After use, clean tip and replace cap. 48 mL 1    lamoTRIgine (LaMICtal) 200 mg tablet Take 1 tablet (200 mg) by mouth 2 times a day. 180 tablet 3    rosuvastatin (Crestor) 5 mg tablet Take 1 tablet (5 mg) by mouth once daily. 90 tablet 3     No current facility-administered medications for this visit.   [4]   Patient Active Problem  List  Diagnosis    Seizure (Multi)    Obesity (BMI 30-39.9)    JS on CPAP    Congenital anomaly of cerebrovascular system (HHS-HCC)    HLD (hyperlipidemia)    Prediabetes    Lung consolidation    Focal epilepsy (Multi)    Cavernoma

## 2025-07-09 LAB
ALBUMIN SERPL-MCNC: 4.6 G/DL (ref 3.6–5.1)
ALP SERPL-CCNC: 87 U/L (ref 35–144)
ALT SERPL-CCNC: 40 U/L (ref 9–46)
ANION GAP SERPL CALCULATED.4IONS-SCNC: 6 MMOL/L (CALC) (ref 7–17)
AST SERPL-CCNC: 27 U/L (ref 10–35)
BILIRUB SERPL-MCNC: 0.5 MG/DL (ref 0.2–1.2)
BUN SERPL-MCNC: 16 MG/DL (ref 7–25)
CALCIUM SERPL-MCNC: 9.5 MG/DL (ref 8.6–10.3)
CHLORIDE SERPL-SCNC: 105 MMOL/L (ref 98–110)
CHOLEST SERPL-MCNC: 153 MG/DL
CHOLEST/HDLC SERPL: 2.4 (CALC)
CO2 SERPL-SCNC: 29 MMOL/L (ref 20–32)
CREAT SERPL-MCNC: 0.92 MG/DL (ref 0.7–1.35)
EGFRCR SERPLBLD CKD-EPI 2021: 95 ML/MIN/1.73M2
ERYTHROCYTE [DISTWIDTH] IN BLOOD BY AUTOMATED COUNT: 13.9 % (ref 11–15)
EST. AVERAGE GLUCOSE BLD GHB EST-MCNC: 114 MG/DL
EST. AVERAGE GLUCOSE BLD GHB EST-SCNC: 6.3 MMOL/L
GLUCOSE SERPL-MCNC: 89 MG/DL (ref 65–99)
HBA1C MFR BLD: 5.6 %
HCT VFR BLD AUTO: 45.4 % (ref 38.5–50)
HDLC SERPL-MCNC: 63 MG/DL
HGB BLD-MCNC: 14.7 G/DL (ref 13.2–17.1)
LDLC SERPL CALC-MCNC: 76 MG/DL (CALC)
MCH RBC QN AUTO: 30.4 PG (ref 27–33)
MCHC RBC AUTO-ENTMCNC: 32.4 G/DL (ref 32–36)
MCV RBC AUTO: 93.8 FL (ref 80–100)
NONHDLC SERPL-MCNC: 90 MG/DL (CALC)
PLATELET # BLD AUTO: 304 THOUSAND/UL (ref 140–400)
PMV BLD REES-ECKER: 8.9 FL (ref 7.5–12.5)
POTASSIUM SERPL-SCNC: 4.4 MMOL/L (ref 3.5–5.3)
PROT SERPL-MCNC: 6.8 G/DL (ref 6.1–8.1)
PSA SERPL-MCNC: 2.39 NG/ML
RBC # BLD AUTO: 4.84 MILLION/UL (ref 4.2–5.8)
SODIUM SERPL-SCNC: 140 MMOL/L (ref 135–146)
TRIGL SERPL-MCNC: 55 MG/DL
TSH SERPL-ACNC: 1.44 MIU/L (ref 0.4–4.5)
WBC # BLD AUTO: 5.2 THOUSAND/UL (ref 3.8–10.8)

## 2025-08-21 ENCOUNTER — APPOINTMENT (OUTPATIENT)
Dept: AUDIOLOGY | Facility: CLINIC | Age: 60
End: 2025-08-21
Payer: COMMERCIAL

## 2025-08-21 DIAGNOSIS — H93.8X1 EAR PRESSURE, RIGHT: ICD-10-CM

## 2025-08-21 DIAGNOSIS — H90.3 SENSORINEURAL HEARING LOSS (SNHL) OF BOTH EARS: Primary | ICD-10-CM

## 2025-10-16 ENCOUNTER — APPOINTMENT (OUTPATIENT)
Dept: SLEEP MEDICINE | Facility: CLINIC | Age: 60
End: 2025-10-16
Payer: COMMERCIAL

## 2026-02-19 ENCOUNTER — APPOINTMENT (OUTPATIENT)
Dept: AUDIOLOGY | Facility: CLINIC | Age: 61
End: 2026-02-19
Payer: COMMERCIAL